# Patient Record
Sex: FEMALE | Race: BLACK OR AFRICAN AMERICAN | NOT HISPANIC OR LATINO
[De-identification: names, ages, dates, MRNs, and addresses within clinical notes are randomized per-mention and may not be internally consistent; named-entity substitution may affect disease eponyms.]

---

## 2018-08-14 PROBLEM — Z00.00 ENCOUNTER FOR PREVENTIVE HEALTH EXAMINATION: Status: ACTIVE | Noted: 2018-08-14

## 2018-08-20 ENCOUNTER — APPOINTMENT (OUTPATIENT)
Dept: THORACIC SURGERY | Facility: CLINIC | Age: 73
End: 2018-08-20
Payer: MEDICARE

## 2018-08-20 VITALS
OXYGEN SATURATION: 100 % | HEIGHT: 63 IN | RESPIRATION RATE: 15 BRPM | SYSTOLIC BLOOD PRESSURE: 150 MMHG | DIASTOLIC BLOOD PRESSURE: 80 MMHG | TEMPERATURE: 98.1 F | HEART RATE: 63 BPM | BODY MASS INDEX: 31.54 KG/M2 | WEIGHT: 178 LBS

## 2018-08-20 DIAGNOSIS — Z87.19 PERSONAL HISTORY OF OTHER DISEASES OF THE DIGESTIVE SYSTEM: ICD-10-CM

## 2018-08-20 DIAGNOSIS — Z86.79 PERSONAL HISTORY OF OTHER DISEASES OF THE CIRCULATORY SYSTEM: ICD-10-CM

## 2018-08-20 DIAGNOSIS — Z98.890 OTHER SPECIFIED POSTPROCEDURAL STATES: ICD-10-CM

## 2018-08-20 DIAGNOSIS — Z86.39 PERSONAL HISTORY OF OTHER ENDOCRINE, NUTRITIONAL AND METABOLIC DISEASE: ICD-10-CM

## 2018-08-20 DIAGNOSIS — Z78.9 OTHER SPECIFIED HEALTH STATUS: ICD-10-CM

## 2018-08-20 DIAGNOSIS — Z87.891 PERSONAL HISTORY OF NICOTINE DEPENDENCE: ICD-10-CM

## 2018-08-20 PROCEDURE — 99205 OFFICE O/P NEW HI 60 MIN: CPT

## 2018-08-20 RX ORDER — DULAGLUTIDE 1.5 MG/.5ML
1.5 INJECTION, SOLUTION SUBCUTANEOUS
Qty: 6 | Refills: 0 | Status: ACTIVE | COMMUNITY
Start: 2018-03-16

## 2018-08-20 RX ORDER — INSULIN DETEMIR 100 [IU]/ML
100 INJECTION, SOLUTION SUBCUTANEOUS
Qty: 15 | Refills: 0 | Status: DISCONTINUED | COMMUNITY
Start: 2018-02-26

## 2018-08-20 RX ORDER — METOPROLOL SUCCINATE 100 MG/1
100 TABLET, EXTENDED RELEASE ORAL
Qty: 90 | Refills: 0 | Status: DISCONTINUED | COMMUNITY
Start: 2018-06-01

## 2018-08-20 RX ORDER — METHIMAZOLE 5 MG/1
5 TABLET ORAL
Qty: 69 | Refills: 0 | Status: ACTIVE | COMMUNITY
Start: 2018-05-13

## 2018-08-20 RX ORDER — RIVAROXABAN 20 MG/1
20 TABLET, FILM COATED ORAL
Qty: 90 | Refills: 0 | Status: ACTIVE | COMMUNITY
Start: 2018-06-15

## 2018-08-20 RX ORDER — INSULIN ASPART 100 [IU]/ML
100 INJECTION, SOLUTION INTRAVENOUS; SUBCUTANEOUS
Qty: 15 | Refills: 0 | Status: ACTIVE | COMMUNITY
Start: 2018-02-20

## 2018-08-20 RX ORDER — METOPROLOL TARTRATE 50 MG/1
50 TABLET, FILM COATED ORAL
Qty: 180 | Refills: 0 | Status: DISCONTINUED | COMMUNITY
Start: 2018-04-04

## 2018-08-20 RX ORDER — VERAPAMIL HYDROCHLORIDE 120 MG/1
120 CAPSULE, EXTENDED RELEASE ORAL
Qty: 90 | Refills: 0 | Status: ACTIVE | COMMUNITY
Start: 2018-06-29

## 2018-08-20 RX ORDER — EZETIMIBE 10 MG/1
10 TABLET ORAL
Qty: 90 | Refills: 0 | Status: ACTIVE | COMMUNITY
Start: 2018-04-25

## 2018-08-20 RX ORDER — METOPROLOL TARTRATE 50 MG/1
50 TABLET, FILM COATED ORAL TWICE DAILY
Qty: 180 | Refills: 0 | Status: ACTIVE | COMMUNITY

## 2018-08-20 RX ORDER — INSULIN DEGLUDEC INJECTION 100 U/ML
100 INJECTION, SOLUTION SUBCUTANEOUS
Qty: 45 | Refills: 0 | Status: ACTIVE | COMMUNITY
Start: 2018-03-16

## 2018-08-20 RX ORDER — AZITHROMYCIN 250 MG/1
250 TABLET, FILM COATED ORAL
Qty: 6 | Refills: 0 | Status: DISCONTINUED | COMMUNITY
Start: 2018-06-28

## 2018-08-20 RX ORDER — PANTOPRAZOLE 40 MG/1
40 TABLET, DELAYED RELEASE ORAL
Qty: 90 | Refills: 0 | Status: ACTIVE | COMMUNITY
Start: 2018-05-13

## 2018-11-29 ENCOUNTER — APPOINTMENT (OUTPATIENT)
Dept: VASCULAR SURGERY | Facility: CLINIC | Age: 73
End: 2018-11-29
Payer: MEDICARE

## 2018-11-29 VITALS
HEIGHT: 63 IN | WEIGHT: 178 LBS | HEART RATE: 58 BPM | DIASTOLIC BLOOD PRESSURE: 81 MMHG | BODY MASS INDEX: 31.54 KG/M2 | SYSTOLIC BLOOD PRESSURE: 163 MMHG

## 2018-11-29 DIAGNOSIS — Z80.9 FAMILY HISTORY OF MALIGNANT NEOPLASM, UNSPECIFIED: ICD-10-CM

## 2018-11-29 PROCEDURE — 93880 EXTRACRANIAL BILAT STUDY: CPT

## 2018-11-29 PROCEDURE — 99203 OFFICE O/P NEW LOW 30 MIN: CPT

## 2018-11-29 RX ORDER — PRAVASTATIN SODIUM 10 MG/1
10 TABLET ORAL DAILY
Refills: 0 | Status: DISCONTINUED | COMMUNITY
End: 2018-11-29

## 2018-11-29 RX ORDER — INSULIN DEGLUDEC INJECTION 200 U/ML
INJECTION, SOLUTION SUBCUTANEOUS
Refills: 0 | Status: ACTIVE | COMMUNITY

## 2018-11-29 RX ORDER — FLUTICASONE PROPIONATE 50 UG/1
50 SPRAY, METERED NASAL
Qty: 16 | Refills: 0 | Status: ACTIVE | COMMUNITY
Start: 2018-10-29

## 2018-12-07 ENCOUNTER — OTHER (OUTPATIENT)
Age: 73
End: 2018-12-07

## 2018-12-17 ENCOUNTER — APPOINTMENT (OUTPATIENT)
Dept: THORACIC SURGERY | Facility: CLINIC | Age: 73
End: 2018-12-17
Payer: MEDICARE

## 2018-12-17 VITALS
RESPIRATION RATE: 16 BRPM | DIASTOLIC BLOOD PRESSURE: 79 MMHG | TEMPERATURE: 97.9 F | HEIGHT: 63 IN | HEART RATE: 65 BPM | OXYGEN SATURATION: 96 % | BODY MASS INDEX: 30.48 KG/M2 | WEIGHT: 172 LBS | SYSTOLIC BLOOD PRESSURE: 151 MMHG

## 2018-12-17 DIAGNOSIS — J98.59 OTHER DISEASES OF MEDIASTINUM, NOT ELSEWHERE CLASSIFIED: ICD-10-CM

## 2018-12-17 PROCEDURE — 99215 OFFICE O/P EST HI 40 MIN: CPT

## 2019-05-20 ENCOUNTER — OTHER (OUTPATIENT)
Age: 74
End: 2019-05-20

## 2019-05-23 ENCOUNTER — OTHER (OUTPATIENT)
Age: 74
End: 2019-05-23

## 2019-05-28 ENCOUNTER — OTHER (OUTPATIENT)
Age: 74
End: 2019-05-28

## 2019-06-03 ENCOUNTER — APPOINTMENT (OUTPATIENT)
Dept: THORACIC SURGERY | Facility: CLINIC | Age: 74
End: 2019-06-03
Payer: MEDICARE

## 2019-06-03 VITALS
RESPIRATION RATE: 18 BRPM | WEIGHT: 173 LBS | TEMPERATURE: 97.7 F | HEART RATE: 57 BPM | BODY MASS INDEX: 30.65 KG/M2 | DIASTOLIC BLOOD PRESSURE: 80 MMHG | SYSTOLIC BLOOD PRESSURE: 161 MMHG | OXYGEN SATURATION: 97 %

## 2019-06-03 PROCEDURE — 99215 OFFICE O/P EST HI 40 MIN: CPT

## 2019-06-03 NOTE — HISTORY OF PRESENT ILLNESS
[FreeTextEntry1] : 73 year old female former smoker with history of A fib (on Xarelto, s/p ablation 5/2017) hyperlipidemia, and hyperthyroidism, GERD, Diabetes and left sided implanted Cardiac Loop recorder. She is referred by Dr. Gordon Marquez. As per his note: the patient had a CXR in June 2018 which revealed an anterior mediastinal mass. CT Scan of the Chest revealed bilateral pulmonary nodules. \par  \par CXR 6/28/2018: \par -Possible Mediastinal Mass (as per Dr. Marquez's note) \par -Prominent Right aspect Mid Mediastinum \par - Tortuous thoracic aorta\par -Mild Cardiomegaly\par \par CT Chest 7/23/2018 reveals: (Comparison made to CT Pulmonary Angiogram 12/12/2012 and CT Cardiac and pulmonary Venogram 5/19/2017)\par - Minimal increase in size of LLL nodule\par - Shotty precarinal amd subcarinal LN stable\par - Old 2.3 cm bleb LLL anterolateral\par - 2 mm nodule RML\par - 4mm subpleural nodule Superior segment RLL\par - 3 mm nodule RLL anterior\par - 4mm nodule\par - 3 mm nodule lingula \par - interval increase in size of 3 mm nodule LLL (previous 1 mm in 12/2012 and 2 mm 5/2017) \par \par CT chest on 12/13/18:\par - no new parenchymal masses or nodules\par - multiple stable suncentimeter noncalcified bilateral pulmonary nodules, up to 5 mm, largest in superior RLL. \par - stable nodules in LLL, 3 mm \par - stable subcentimeter paratracheal, aorticcopulmonary window, and subcarinal LNs\par \par CT Chest on 5/29/19:\par - stable 5mm RLL\par - stable 3mm LLL\par - emphysematous lung changes\par \par Pt presents today for follow up. The patient denies SOB, cough, chest pain, hemoptysis, palpitation, fever, recent illness, hospitalization and significant weight loss.\par

## 2019-06-03 NOTE — CONSULT LETTER
[FreeTextEntry2] : Gordon Marquez MD  [FreeTextEntry3] : Horacio Garrett MD \par Department of Cardiovascular and Thoracic Surgery \par  \par [facility] at [facility] \par \par \par

## 2019-06-03 NOTE — ASSESSMENT
[FreeTextEntry1] : 73 year old female former smoker with history of A fib (on Xarelto, s/p ablation 5/2017) hyperlipidemia, and hyperthyroidism, GERD, Diabetes and left sided implanted Cardiac Loop recorder. She is referred by Dr. Gordon Marquez. As per his note: the patient had a CXR in June 2018 which revealed an anterior mediastinal mass. CT Scan of the Chest revealed bilateral pulmonary nodules. \par \par CT Chest on 5/29/19:\par - stable 5mm RLL\par - stable 3mm LLL\par - emphysematous lung changes\par \par I have reviewed the patient's medical records and diagnostic images at time of this office consultation and have made the following recommendation:\par Plan:\par 1. RTC in 6 months with CT chest w/o contrast. \par \par \par \par Written by Nubia Marley NP, acting as a scribe for Dr. Horacio Garrett.    \par “The documentation recorded by the scribe accurately reflects the service I personally performed and the decisions made by me.” Horacio Garrett MD.\par \par \par

## 2019-06-03 NOTE — DATA REVIEWED
[FreeTextEntry1] : CT Chest on 5/29/19:\par - stable 5mm RLL\par - stable 3mm LLL\par - emphysematous lung changes

## 2019-06-03 NOTE — PHYSICAL EXAM
[Sclera] : the sclera and conjunctiva were normal [PERRL With Normal Accommodation] : pupils were equal in size, round, and reactive to light [Neck Appearance] : the appearance of the neck was normal [Respiration, Rhythm And Depth] : normal respiratory rhythm and effort [Auscultation Breath Sounds / Voice Sounds] : lungs were clear to auscultation bilaterally [Heart Rate And Rhythm] : heart rate was normal and rhythm regular [Heart Sounds] : normal S1 and S2 [Examination Of The Chest] : the chest was normal in appearance [2+] : left 2+ [No Pulse Delay] : no pulse delay [Bowel Sounds] : normal bowel sounds [Abdomen Soft] : soft [Abdomen Tenderness] : non-tender [Cervical Lymph Nodes Enlarged Posterior Bilaterally] : posterior cervical [Cervical Lymph Nodes Enlarged Anterior Bilaterally] : anterior cervical [No CVA Tenderness] : no ~M costovertebral angle tenderness [Abnormal Walk] : normal gait [Involuntary Movements] : no involuntary movements were seen [Skin Color & Pigmentation] : normal skin color and pigmentation [Skin Turgor] : normal skin turgor [] : no rash [No Focal Deficits] : no focal deficits [Oriented To Time, Place, And Person] : oriented to person, place, and time [Affect] : the affect was normal [Mood] : the mood was normal [Fingers] :  capillary refill of the fingers was normal [FreeTextEntry1] : deferred

## 2019-10-23 ENCOUNTER — OTHER (OUTPATIENT)
Age: 74
End: 2019-10-23

## 2019-10-30 ENCOUNTER — OTHER (OUTPATIENT)
Age: 74
End: 2019-10-30

## 2019-11-01 ENCOUNTER — OTHER (OUTPATIENT)
Age: 74
End: 2019-11-01

## 2019-11-18 ENCOUNTER — APPOINTMENT (OUTPATIENT)
Dept: THORACIC SURGERY | Facility: CLINIC | Age: 74
End: 2019-11-18
Payer: MEDICARE

## 2019-11-18 VITALS
BODY MASS INDEX: 29.41 KG/M2 | RESPIRATION RATE: 16 BRPM | SYSTOLIC BLOOD PRESSURE: 149 MMHG | TEMPERATURE: 98.4 F | OXYGEN SATURATION: 93 % | HEART RATE: 74 BPM | WEIGHT: 166 LBS | DIASTOLIC BLOOD PRESSURE: 81 MMHG

## 2019-11-18 PROCEDURE — 99214 OFFICE O/P EST MOD 30 MIN: CPT

## 2019-11-18 NOTE — HISTORY OF PRESENT ILLNESS
[FreeTextEntry1] : 74 year old female former smoker with history of A fib (on Xarelto, s/p ablation 5/2017) hyperlipidemia, and hyperthyroidism, GERD, Diabetes and left sided implanted Cardiac Loop recorder. She is referred by Dr. Gordon Marquez. As per his note: the patient had a CXR in June 2018 which revealed an anterior mediastinal mass. CT Scan of the Chest revealed bilateral pulmonary nodules. \par  \par CT chest on 12/13/18:\par - no new parenchymal masses or nodules\par - multiple stable subcentimeter noncalcified bilateral pulmonary nodules, up to 5 mm, largest in superior RLL. \par - stable nodules in LLL, 3 mm \par - stable subcentimeter paratracheal, aorticcopulmonary window, and subcarinal LNs\par \par CT Chest on 5/29/19:\par - stable 5mm RLL\par - stable 3mm LLL\par - emphysematous lung changes\par \par CT Chest on 11/7/19:\par - stable 5 mm nodule RLL\par - 3-4 mm nodule, stable \par \par Pt presents today for follow up. Pt admits to SOB on exertion, denies cough or CP. \par

## 2019-11-18 NOTE — ASSESSMENT
[FreeTextEntry1] : 74 year old female former smoker with history of A fib (on Xarelto, s/p ablation 5/2017) hyperlipidemia, and hyperthyroidism, GERD, Diabetes and left sided implanted Cardiac Loop recorder. She is referred by Dr. Gordon Marquez for bilateral pulmonary nodules. \par  \par CT Chest on 11/7/19:\par - stable 5 mm nodule RLL\par - 3-4 mm nodule, stable \par \par I have reviewed the patient's medical records and diagnostic images at time of this office consultation and have made the following recommendation:\par 1. CT reviewed with pt,  stable scan. RTC in 1 yr with CT Chest w/o contrast.\par \par \par Written by Sabina Mcdaniel NP, acting as a scribe for Dr. Horacio Lloyd. \par \par The documentation recorded by the scribe accurately reflects the service I personally performed and the decisions made by me. HORACIO LLOYD MD

## 2019-11-18 NOTE — CONSULT LETTER
[Dear  ___] : Dear  [unfilled], [Courtesy Letter:] : I had the pleasure of seeing your patient, [unfilled], in my office today. [Please see my note below.] : Please see my note below. [Sincerely,] : Sincerely, [FreeTextEntry2] : Gordon Marquez MD  [FreeTextEntry3] : Horacio Garrett MD \par Department of Cardiovascular and Thoracic Surgery \par  \par [facility] at [facility] \par \par \par

## 2019-11-18 NOTE — PHYSICAL EXAM
[Auscultation Breath Sounds / Voice Sounds] : lungs were clear to auscultation bilaterally [Heart Sounds Gallop] : no gallops [Heart Rate And Rhythm] : heart rate was normal and rhythm regular [Heart Sounds] : normal S1 and S2 [Murmurs] : no murmurs [Heart Sounds Pericardial Friction Rub] : no pericardial rub [Examination Of The Chest] : the chest was normal in appearance [Chest Visual Inspection Thoracic Asymmetry] : no chest asymmetry [Diminished Respiratory Excursion] : normal chest expansion [Bowel Sounds] : normal bowel sounds [Abdomen Tenderness] : non-tender [Abdomen Soft] : soft [Abdomen Mass (___ Cm)] : no abdominal mass palpated [Abnormal Walk] : normal gait [Nail Clubbing] : no clubbing  or cyanosis of the fingernails [Musculoskeletal - Swelling] : no joint swelling seen [Motor Tone] : muscle strength and tone were normal [Skin Color & Pigmentation] : normal skin color and pigmentation [Skin Turgor] : normal skin turgor [] : no rash [Sensation] : the sensory exam was normal to light touch and pinprick [Deep Tendon Reflexes (DTR)] : deep tendon reflexes were 2+ and symmetric [No Focal Deficits] : no focal deficits [Oriented To Time, Place, And Person] : oriented to person, place, and time [Affect] : the affect was normal [Impaired Insight] : insight and judgment were intact

## 2020-09-09 ENCOUNTER — APPOINTMENT (OUTPATIENT)
Dept: PULMONOLOGY | Facility: CLINIC | Age: 75
End: 2020-09-09
Payer: MEDICARE

## 2020-09-09 VITALS
WEIGHT: 166 LBS | TEMPERATURE: 98.1 F | BODY MASS INDEX: 29.41 KG/M2 | OXYGEN SATURATION: 95 % | RESPIRATION RATE: 15 BRPM | DIASTOLIC BLOOD PRESSURE: 75 MMHG | HEIGHT: 63 IN | HEART RATE: 65 BPM | SYSTOLIC BLOOD PRESSURE: 136 MMHG

## 2020-09-09 PROCEDURE — 99204 OFFICE O/P NEW MOD 45 MIN: CPT | Mod: 25

## 2020-09-09 PROCEDURE — 36415 COLL VENOUS BLD VENIPUNCTURE: CPT

## 2020-09-09 RX ORDER — BUDESONIDE AND FORMOTEROL FUMARATE DIHYDRATE 160; 4.5 UG/1; UG/1
160-4.5 AEROSOL RESPIRATORY (INHALATION) TWICE DAILY
Qty: 2 | Refills: 2 | Status: ACTIVE | COMMUNITY
Start: 2020-09-09 | End: 1900-01-01

## 2020-09-09 NOTE — CONSULT LETTER
[Dear  ___] : Dear  [unfilled], [Please see my note below.] : Please see my note below. [Consult Letter:] : I had the pleasure of evaluating your patient, [unfilled]. [Consult Closing:] : Thank you very much for allowing me to participate in the care of this patient.  If you have any questions, please do not hesitate to contact me. [Sincerely,] : Sincerely, [FreeTextEntry3] : Yours truly,\par \par Gordon Marquez MD\par

## 2020-09-09 NOTE — PHYSICAL EXAM
[Normal Oropharynx] : normal oropharynx [No Acute Distress] : no acute distress [Normal Appearance] : normal appearance [No Neck Mass] : no neck mass [Normal S1, S2] : normal s1, s2 [Normal Rate/Rhythm] : normal rate/rhythm [No Resp Distress] : no resp distress [No Murmurs] : no murmurs [No Abnormalities] : no abnormalities [Clear to Auscultation Bilaterally] : clear to auscultation bilaterally [Benign] : benign [Normal Gait] : normal gait [No Clubbing] : no clubbing [No Cyanosis] : no cyanosis [No Edema] : no edema [FROM] : FROM [No Focal Deficits] : no focal deficits [Normal Color/ Pigmentation] : normal color/ pigmentation [Oriented x3] : oriented x3 [Normal Affect] : normal affect

## 2020-09-09 NOTE — REASON FOR VISIT
[Initial] : an initial visit [COPD] : COPD [Sleep Apnea] : sleep apnea [Abnormal CXR/ Chest CT] : an abnormal CXR/ chest CT [Pulmonary Nodules] : pulmonary nodules [Shortness of Breath] : shortness of breath [TextBox_13] : Dr Ashley Pereyra

## 2020-09-13 LAB
ALBUMIN SERPL ELPH-MCNC: 4.1 G/DL
ALP BLD-CCNC: 57 U/L
ALT SERPL-CCNC: 14 U/L
ANION GAP SERPL CALC-SCNC: 13 MMOL/L
AST SERPL-CCNC: 18 U/L
BASOPHILS # BLD AUTO: 0.03 K/UL
BASOPHILS NFR BLD AUTO: 0.4 %
BILIRUB SERPL-MCNC: 0.3 MG/DL
BUN SERPL-MCNC: 12 MG/DL
CALCIUM SERPL-MCNC: 9.2 MG/DL
CHLORIDE SERPL-SCNC: 104 MMOL/L
CO2 SERPL-SCNC: 21 MMOL/L
CREAT SERPL-MCNC: 0.81 MG/DL
EOSINOPHIL # BLD AUTO: 0.19 K/UL
EOSINOPHIL # BLD MANUAL: 210 /UL
EOSINOPHIL NFR BLD AUTO: 2.6 %
GLUCOSE SERPL-MCNC: 260 MG/DL
HCT VFR BLD CALC: 43.3 %
HGB BLD-MCNC: 13.2 G/DL
IMM GRANULOCYTES NFR BLD AUTO: 0.4 %
LYMPHOCYTES # BLD AUTO: 2.4 K/UL
LYMPHOCYTES NFR BLD AUTO: 33.1 %
MAN DIFF?: NORMAL
MCHC RBC-ENTMCNC: 28.7 PG
MCHC RBC-ENTMCNC: 30.5 GM/DL
MCV RBC AUTO: 94.1 FL
MONOCYTES # BLD AUTO: 0.49 K/UL
MONOCYTES NFR BLD AUTO: 6.7 %
NEUTROPHILS # BLD AUTO: 4.12 K/UL
NEUTROPHILS NFR BLD AUTO: 56.8 %
NT-PROBNP SERPL-MCNC: 346 PG/ML
PLATELET # BLD AUTO: 279 K/UL
POTASSIUM SERPL-SCNC: 4.5 MMOL/L
PROT SERPL-MCNC: 6.2 G/DL
RBC # BLD: 4.6 M/UL
RBC # FLD: 16.3 %
SODIUM SERPL-SCNC: 137 MMOL/L
TOTAL IGE SMQN RAST: 348 KU/L
TSH SERPL-ACNC: 0.04 UIU/ML
WBC # FLD AUTO: 7.26 K/UL

## 2020-09-17 LAB — SARS-COV-2 N GENE NPH QL NAA+PROBE: NOT DETECTED

## 2020-09-18 ENCOUNTER — APPOINTMENT (OUTPATIENT)
Dept: PULMONOLOGY | Facility: CLINIC | Age: 75
End: 2020-09-18
Payer: MEDICARE

## 2020-09-18 PROCEDURE — 94729 DIFFUSING CAPACITY: CPT

## 2020-09-18 PROCEDURE — 94010 BREATHING CAPACITY TEST: CPT

## 2020-09-18 PROCEDURE — 94727 GAS DIL/WSHOT DETER LNG VOL: CPT

## 2020-09-24 ENCOUNTER — APPOINTMENT (OUTPATIENT)
Dept: PULMONOLOGY | Facility: CLINIC | Age: 75
End: 2020-09-24
Payer: MEDICARE

## 2020-09-24 VITALS
DIASTOLIC BLOOD PRESSURE: 80 MMHG | WEIGHT: 166 LBS | BODY MASS INDEX: 29.41 KG/M2 | SYSTOLIC BLOOD PRESSURE: 127 MMHG | HEIGHT: 63 IN | HEART RATE: 67 BPM | RESPIRATION RATE: 16 BRPM | OXYGEN SATURATION: 97 %

## 2020-09-24 PROCEDURE — 99204 OFFICE O/P NEW MOD 45 MIN: CPT

## 2020-09-24 PROCEDURE — 99213 OFFICE O/P EST LOW 20 MIN: CPT

## 2020-09-24 NOTE — PHYSICAL EXAM
[General Appearance - Well Developed] : well developed [Normal Appearance] : normal appearance [General Appearance - Well Nourished] : well nourished [Low Lying Soft Palate] : low lying soft palate [III] : III [Neck Appearance] : the appearance of the neck was normal [Neck Cervical Mass (___cm)] : no neck mass was observed [Apical Impulse] : the apical impulse was normal [Heart Rate And Rhythm] : heart rate was normal and rhythm regular [Heart Sounds] : normal S1 and S2 [] : no respiratory distress [Respiration, Rhythm And Depth] : normal respiratory rhythm and effort [Auscultation Breath Sounds / Voice Sounds] : lungs were clear to auscultation bilaterally [Nail Clubbing] : no clubbing of the fingernails [Cyanosis, Localized] : no localized cyanosis [No Focal Deficits] : no focal deficits [Oriented To Time, Place, And Person] : oriented to person, place, and time [Impaired Insight] : insight and judgment were intact [Affect] : the affect was normal [FreeTextEntry2] : no edema

## 2020-09-24 NOTE — REASON FOR VISIT
[Consultation] : a consultation visit [Sleep Apnea] : sleep apnea [FreeTextEntry1] : Referred by Dr. Marquez

## 2020-09-24 NOTE — CONSULT LETTER
[Dear  ___] : Dear  [unfilled], [Consult Letter:] : I had the pleasure of evaluating your patient, [unfilled]. [Please see my note below.] : Please see my note below. [Consult Closing:] : Thank you very much for allowing me to participate in the care of this patient.  If you have any questions, please do not hesitate to contact me. [Sincerely,] : Sincerely, [FreeTextEntry3] : Lon Miller DO, MPH\par Pulmonary, Critical Care, and Sleep Medicine\par  of Medicine\par [employer] [facility]

## 2020-09-24 NOTE — REVIEW OF SYSTEMS
[EDS: ESS=____] : daytime somnolence: ESS=[unfilled] [Snoring] : snoring [Witnessed Apneas] : witnessed apnea [A.M. Headache] : headache present upon awakening [Arthralgias] : arthralgias [Difficulty Maintaining Sleep] : difficulty maintaining sleep [Negative] : Psychiatric [Difficulty Initiating Sleep] : no difficulty falling asleep [Lower Extremity Discomfort] : no lower extremity discomfort [Irresistible urge to move legs] : no irresistible urge to move legs because of lower extremity discomfort [Late day/ Evening symptoms] : no late day/evening symptoms [Hypersomnolence] : not sleeping much more than usual [Cataplexy] :  no cataplexy [Hypnogogic Hallucinations] : no hypnogogic hallucinations [Hypnopompic Hallucinations] : no hypnopompic hallucinations [Sleep Paralysis] : no sleep paralysis

## 2020-09-24 NOTE — ASSESSMENT
[FreeTextEntry1] : This is a 74 year old female referred by Jimmy for a new CPAP machine. Since her last sleep study was 10 years ago, will get a new sleep study to confirm diagnosis and severity. Will contact Miguel to obtain previous sleep records. The patient has multiple signs and symptoms of ALICIA without using her CPAP machine include snoring, witnessed apnea, nonrestorative sleep, and frequent nocturnal awakenings. She was referred to the [facility] for a diagnostic HSAT. The ramifications of ALICIA and its potential therapeutic modalities were discussed with the patient. The patient was cautioned on the importance of avoiding drowsy driving. She will follow up with us after the HSAT. If previous records are obtained and her apnea is roughly the same on the HST then will order CPAP settings from her previous CPAP machine.

## 2020-09-30 ENCOUNTER — APPOINTMENT (OUTPATIENT)
Dept: PULMONOLOGY | Facility: CLINIC | Age: 75
End: 2020-09-30
Payer: MEDICARE

## 2020-09-30 VITALS
SYSTOLIC BLOOD PRESSURE: 129 MMHG | OXYGEN SATURATION: 97 % | RESPIRATION RATE: 14 BRPM | WEIGHT: 165 LBS | BODY MASS INDEX: 29.23 KG/M2 | HEART RATE: 72 BPM | HEIGHT: 63 IN | TEMPERATURE: 98.3 F | DIASTOLIC BLOOD PRESSURE: 78 MMHG

## 2020-09-30 PROCEDURE — 99214 OFFICE O/P EST MOD 30 MIN: CPT

## 2020-09-30 RX ORDER — BUDESONIDE AND FORMOTEROL FUMARATE DIHYDRATE 80; 4.5 UG/1; UG/1
80-4.5 AEROSOL RESPIRATORY (INHALATION)
Qty: 3 | Refills: 1 | Status: ACTIVE | COMMUNITY
Start: 2020-09-30 | End: 1900-01-01

## 2020-09-30 NOTE — CONSULT LETTER
[Dear  ___] : Dear  [unfilled], [Consult Letter:] : I had the pleasure of evaluating your patient, [unfilled]. [Please see my note below.] : Please see my note below. [Consult Closing:] : Thank you very much for allowing me to participate in the care of this patient.  If you have any questions, please do not hesitate to contact me. [Sincerely,] : Sincerely, [FreeTextEntry3] : Yours truly,\par \par Gordon Marquez MD\par

## 2020-09-30 NOTE — ASSESSMENT
[FreeTextEntry1] : \par \par _____________________________\par HPI\par _____________________________  \par \par 2020 Ms Troncoso is 74 f PMH A fin hypertrophic cardiomyopathy diagnosed with ALICIA 10 y ago and was referred to me for lung nodules sleep apnea and COPD \par She is on xarelto for a fib \par In 2020 she was referred to see sleep specialis Dr Lon Miller \par 2020 She is going for home sleep test \par \par \par \par VISIT DATES \par 2020 SOB at times Takes syymbicort prn and feels better with it but does not need it much once a wweek\par 2020 No wt loss No hemoptysis No swellinglegs \par \par \par ALLERGY. 2020 pncl Has taken zpak in past without problem \par HABITS (ETOH/DRUGS).   2020 Quit smoking  1 ppd No ETOH abuse No drug abuse \par FAMILY.   DM Mother \par Uterine Mother  at age 67  \par Esophagus Brother  at age 51 \par BIRTHPLACE[place]par [place] \par TRAVEL.                 \par IMMUNIZATION.                           \par PETS.                \par OCCUPN.       [facility] Child support\par \par                                             \par CURRENT SMOKING.  \par 2020 No\par 2020 No    .         \par COUGH.    \par 2020 No\par 2020 Occasionally in am .                        \par PHLEGM. \par 2020 Has post nasal drip\par   2020 Yes from nose    \par CLUBBING.    \par 2020 No\par 2020 No                .\par WHEEZE.  \par 2020 No \par 2020 No \par JUGULAR VENOUS DISTENTION.  \par 2020 No \par 2020 No                            \par PEDAL EDEMA.   \par 2020 no \par 2020 No    \par \par                                             \par BP.      \par 2020 129/70 \par 2020 136/75    \par HR.  \par 2020 72 \par 2020 65                                                     \par WT. \par 2020 165 lb     \par 2020 166                        \par BMI.  \par 2020 29 \par 2020 29     \par PULSE OX.\par REST.\par 2020 ra 97% \par 2020 95%\par EXERCISE. \par \par \par MONITORING. \par \par DYSPNEA. \par DATE                  MMRC (M)\par 2020            1\par \par    \par DATE                       COMMENTS   \par 2020               1 I get short of breath when hurrying on level ground or walking up a slight hill \par \par \par \par \par _____________________________\par PROBLEM LIST\par _____________________________  \par \par LUNG NODULES\par 2018 Subcm nodules found\par 2018 Referred to Dr SAIRA Garrett\par Former 1 ppd smoker quit \par Fam HO Ca Uterine mother  age 67 Esoph father and brother  age 51\par \par \par \par COPD\par 2020 75 f gets sob on exertion\par Former smoker quit  Used to smoke 3/4 ppd \par 2020 No  hospital stays \par 2020Bronchitis episode urgent care once around \par FAM  No asthma \par PETS None Used to have dog\par MMRC2020 MMRC 2\par SPIROMETRY.2020 No obstr Mild retsrictive pattern FEF 25 75 decreased suggest small airway obstrn\par LUNG VOLUME.2020 Mild restrn \par DIFFUSION. 2020  Diffusion preserved\par COMMENTS. 2020 pfts cw asthma or chr bronchitis Mild restrn possibly sec to chf\par \par \par SLEEP APNEA\par On CPAP 9 y (2020) \par 3/20/2019 Uses CPAP No EDS \par 2018 has been using CPAP for about 8 years \par 2020 Machine is not working right Wants to see sleep sspecialist as she is getting headaches\par 2020 refre Sleep specialist               \par \par \par ______________________________\par ASSESSMENT/PROBLEMS/ORDERS \par \par _______________________________    \par \par HYPERTHYROID\par 9/10/2020 TSH was .04 (.27-4.2 Advised to dw her PMD and see endocrine \par \par ELEVATED IGE \par 9/10/2020  \par 2020 Results dw pt She may have component of asthma \par \par ASTHMA \par 2020 Results of 2020 PFTs dw pt Explained she may have asthma or chr bronchitis \par 2020 She has scant phlegm and non nocturnal awakenings and no limitation in acvtivity due to breathing so her asthma seems to be well controlled\par She is on Symbicort \par 2020 Will chane to symbicotrt 80 which she takes as needed up to 8p/d\par 2020 Inhaler instructions given and advised to use spacer \par \par LUNG NODULES \par 2020 Pt was denied auth for ct chest\par 2020 She has been seeing Dr SAIRA Garrett CTS and I will refer to him and his office will get auth for ct \par 2020 She is former smoker and has fam ho cancer so will need ct chest \par 2020 RTC 6 wk \par \par \par GURPREET GRESHAM  1945 [phone] (H) [phone] (M) Svitlana merchant MD \par \par

## 2020-10-16 ENCOUNTER — APPOINTMENT (OUTPATIENT)
Dept: SLEEP CENTER | Facility: CLINIC | Age: 75
End: 2020-10-16
Payer: MEDICARE

## 2020-10-16 PROCEDURE — ZZZZZ: CPT

## 2020-10-22 ENCOUNTER — APPOINTMENT (OUTPATIENT)
Dept: SLEEP CENTER | Facility: CLINIC | Age: 75
End: 2020-10-22
Payer: MEDICARE

## 2020-10-22 ENCOUNTER — FORM ENCOUNTER (OUTPATIENT)
Age: 75
End: 2020-10-22

## 2020-10-22 PROCEDURE — ZZZZZ: CPT

## 2020-11-09 ENCOUNTER — NON-APPOINTMENT (OUTPATIENT)
Age: 75
End: 2020-11-09

## 2020-11-19 ENCOUNTER — APPOINTMENT (OUTPATIENT)
Dept: DISASTER EMERGENCY | Facility: CLINIC | Age: 75
End: 2020-11-19

## 2020-11-20 LAB — SARS-COV-2 N GENE NPH QL NAA+PROBE: NOT DETECTED

## 2020-11-22 ENCOUNTER — FORM ENCOUNTER (OUTPATIENT)
Age: 75
End: 2020-11-22

## 2020-11-23 ENCOUNTER — OUTPATIENT (OUTPATIENT)
Dept: OUTPATIENT SERVICES | Facility: HOSPITAL | Age: 75
LOS: 1 days | End: 2020-11-23
Payer: COMMERCIAL

## 2020-11-23 ENCOUNTER — APPOINTMENT (OUTPATIENT)
Dept: SLEEP CENTER | Facility: CLINIC | Age: 75
End: 2020-11-23
Payer: MEDICARE

## 2020-11-23 PROCEDURE — 95810 POLYSOM 6/> YRS 4/> PARAM: CPT | Mod: 26

## 2020-11-23 PROCEDURE — 95810 POLYSOM 6/> YRS 4/> PARAM: CPT

## 2020-11-24 DIAGNOSIS — G47.33 OBSTRUCTIVE SLEEP APNEA (ADULT) (PEDIATRIC): ICD-10-CM

## 2020-11-25 ENCOUNTER — APPOINTMENT (OUTPATIENT)
Dept: PULMONOLOGY | Facility: CLINIC | Age: 75
End: 2020-11-25
Payer: MEDICARE

## 2020-11-25 VITALS
TEMPERATURE: 98.6 F | OXYGEN SATURATION: 97 % | BODY MASS INDEX: 29.08 KG/M2 | WEIGHT: 164.19 LBS | RESPIRATION RATE: 14 BRPM | HEART RATE: 62 BPM | DIASTOLIC BLOOD PRESSURE: 83 MMHG | SYSTOLIC BLOOD PRESSURE: 117 MMHG

## 2020-11-25 PROCEDURE — 99214 OFFICE O/P EST MOD 30 MIN: CPT | Mod: 95

## 2020-11-25 NOTE — ASSESSMENT
[FreeTextEntry1] : _____________________________\par PROBLEM LIST\par _____________________________ .  \par LUNG NODULES\par COPD \par SLEEP APNEA\par POST NASAL DRIP\par GERD\par \par A fib\par HLD \par HYPERTHYROID\par DM\par \par PULSE OX  \par 2020 RA rest 97% \par                                          \par \par                                             \par BP.\par 2020 117/80 \par         \par HR.\par 2020 80 \par                                                                            \par WT.\par 2020 164 lb\par                           \par BMI.   \par 2020 29 \par \par ASSESSMENT PLAN\par 2020 \par LUNG NODULES \par ct Chest [facility] 2018 222  cw 2018 \par 1) No new parenchymal masses or nodules \par 2) Multiple stable subcm noncalci bl nodules upto 5 mm the klargest within the sup rll and in rll \par 3) 2 nodules as well as 3 mm lll nodule are stable cw cta 2017 \par Former 1 ppd smoker quit \par Fam HO Ca Uterine mother  age 67 Esoph father and brother  age 51\par 2020 Pt had been denied ct chest and i had referred her to see Dr SAIRA Garrett for his evaluation \par 2020 Has appt with Dr Garrett in  \par 2020 RTC 2020\par COPD \par Former smoker quit  Used to smoke 3/4 ppd \par 2020 No  hospital stays \par 2020Bronchitis episode urgent care once around \par FAM  No asthma \par PETS None Used to have dog\par PFTS 2020 FEF 25 75 37% FEV1/FVC 71% FEV1 71% dlco 132% TLC 64% MILD RESTRICTION POSSIBLE SMALL AIRWAYS OBSTRUCTION \par Symbicort (2020) --> Symbicort 80 (2020) \par 2020 Rarely has to use symbicort \par 2020 Has intermittent asthma Inhaler instructions reinforced \par POSTNASAL DRIP\par Was started on nasal inhaler in past 2018 Start nasalcort Advised ns nasal spray Sent to [facility] [id number]\par 2020 Sill has postnasal drip \par 2020 Has flonase but not using it regularly \par 2020 Advised to use daily\par SLEEP APNEA\par On CPAP 9 y (2020) \par Advised to use cpap machine regularly as recommended\par GERD\par Advised to sleep with head propped up and to not consume meals for at least 1 hour before bedtime \par 2020 is on pantoprazole \par A fib\par  (on xarelto, s/p ablation in 2017), \par Cardiology, Dr. Chauncey Pierre, [facility] in [facility] for Afib. \par PALPITATIONS\par Has Loop recorder for palpitations.\par \par \par \par \par \par \par \par ___________________\par PATIENT NAME SHAN GRESHAM 1945\par PATIENT INSTRUCTIONS \par Copy was handed to patient at end of visit\par DATE OF VISIT 2020\par _______________________________\par \par 1) Come back to see Dr Marquez 2020 \par 2) See Dr Garrett in Dec as planned \par 3) Use flonas daily to see if it will help the postnasal drip as well as bbreathing\par 4) Keep using symbicort 1 puff every 4 hours if needed for shortness of breath or wheeze and wash mouth after using it \par

## 2020-12-06 ENCOUNTER — RX RENEWAL (OUTPATIENT)
Age: 75
End: 2020-12-06

## 2020-12-07 ENCOUNTER — NON-APPOINTMENT (OUTPATIENT)
Age: 75
End: 2020-12-07

## 2020-12-22 ENCOUNTER — APPOINTMENT (OUTPATIENT)
Dept: THORACIC SURGERY | Facility: CLINIC | Age: 75
End: 2020-12-22

## 2021-01-12 ENCOUNTER — APPOINTMENT (OUTPATIENT)
Dept: THORACIC SURGERY | Facility: CLINIC | Age: 76
End: 2021-01-12
Payer: COMMERCIAL

## 2021-01-12 VITALS
RESPIRATION RATE: 16 BRPM | DIASTOLIC BLOOD PRESSURE: 61 MMHG | WEIGHT: 164 LBS | OXYGEN SATURATION: 97 % | HEART RATE: 61 BPM | SYSTOLIC BLOOD PRESSURE: 129 MMHG | BODY MASS INDEX: 28.7 KG/M2 | HEIGHT: 63.5 IN

## 2021-01-12 DIAGNOSIS — J45.20 MILD INTERMITTENT ASTHMA, UNCOMPLICATED: ICD-10-CM

## 2021-01-12 PROCEDURE — 99213 OFFICE O/P EST LOW 20 MIN: CPT

## 2021-01-12 PROCEDURE — 99072 ADDL SUPL MATRL&STAF TM PHE: CPT

## 2021-01-12 RX ORDER — LISINOPRIL 2.5 MG/1
2.5 TABLET ORAL
Qty: 90 | Refills: 0 | Status: DISCONTINUED | COMMUNITY
Start: 2018-05-13 | End: 2021-01-12

## 2021-01-12 NOTE — ASSESSMENT
[FreeTextEntry1] : 75 year old female former smoker with history of A-fib (on Xarelto, s/p ablation 5/2017) hyperlipidemia, and hyperthyroidism, GERD, Diabetes and left sided implanted Cardiac Loop recorder. \par  \par PFTs on 9/18/2020: FVC 69%, FEV1 71%, DLCO 81%.\par \par CT Chest on 1/6/21:\par - stable moderate emphysema\par - stable, small lung nodules bilaterally since 12/2018: 5 x 5mm RLL (7:106); 4 x 3mm LLL (7:153); 3mm RML (7:164); 3mm RLL (7:184); 5 x 3mm RLL (7:204); 5 x 5mm RLL (7:228)\par - no new lung lesions\par \par I have reviewed the patient's medical records and diagnostic images at time of this office consultation and have made the following recommendation:\par 1. CT scan reviewed, multiple stable lung nodules, I recommended patient to RTC in 1yr with CT CHest w/o contrast.\par \par \par I personally performed the services described in the documentation, reviewed the documentation recorded by the scribe in my presence and it accurately and completely records my words and actions.\par \par I, Imelda Finley NP, am scribing for and the presence of JEFFREY Santos, the following sections HISTORY OF PRESENT ILLNESS, PAST MEDICAL/FAMILY/SOCIAL HISTORY; REVIEW OF SYSTEMS; VITAL SIGNS; PHYSICAL EXAM; DISPOSITION.\par \par

## 2021-01-12 NOTE — DATA REVIEWED
[FreeTextEntry1] : PFTs on 9/18/2020: FVC 69%, FEV1 71%, DLCO 81%.\par \par CT Chest on 1/6/21:\par - stable moderate emphysema\par - stable, small lung nodules bilaterally since 12/2018: 5 x 5mm RLL (7:106); 4 x 3mm LLL (7:153); 3mm RML (7:164); 3mm RLL (7:184); 5 x 3mm RLL (7:204); 5 x 5mm RLL (7:228)\par - no new lung lesions

## 2021-01-12 NOTE — CONSULT LETTER
[Consult Letter:] : I had the pleasure of evaluating your patient, [unfilled]. [( Thank you for referring [unfilled] for consultation for _____ )] : Thank you for referring [unfilled] for consultation for [unfilled] [Please see my note below.] : Please see my note below. [Consult Closing:] : Thank you very much for allowing me to participate in the care of this patient.  If you have any questions, please do not hesitate to contact me. [Sincerely,] : Sincerely, [FreeTextEntry2] : Gordon Marquez MD (Pulm) [FreeTextEntry3] : Horacio Garrett MD\par Director of Thoracic, [facility]\par Cardiovascular & Thoracic Surgery\par \par [facility]\par [address]\par Oncology Building 3rd Fl\[place] [address]\par Tel: [phone]\par Fax: [phone]\par

## 2021-01-12 NOTE — HISTORY OF PRESENT ILLNESS
[FreeTextEntry1] : 75 year old female former smoker with history of A-fib (on Xarelto, s/p ablation 5/2017) hyperlipidemia, and hyperthyroidism, GERD, Diabetes and left sided implanted Cardiac Loop recorder. She is referred by Dr. Gordon Marquez. As per his note: the patient had a CXR in June 2018 which revealed an anterior mediastinal mass. CT Scan of the Chest revealed bilateral pulmonary nodules. \par  \par CT Chest on 5/29/19:\par - stable 5mm RLL\par - stable 3mm LLL\par - emphysematous lung changes\par \par CT Chest on 11/7/19:\par - stable 5 mm nodule RLL\par - 3-4 mm nodule, stable \par \par PFTs on 9/18/2020: FVC 69%, FEV1 71%, DLCO 81%.\par \par CT Chest on 1/6/21:\par - stable moderate emphysema\par - stable, small lung nodules bilaterally since 12/2018: 5 x 5mm RLL (7:106); 4 x 3mm LLL (7:153); 3mm RML (7:164); 3mm RLL (7:184); 5 x 3mm RLL (7:204); 5 x 5mm RLL (7:228)\par - no new lung lesions\par \par Patient is here today for a follow up. Denies SOB, CP, cough.

## 2021-01-24 ENCOUNTER — RX RENEWAL (OUTPATIENT)
Age: 76
End: 2021-01-24

## 2021-01-27 ENCOUNTER — APPOINTMENT (OUTPATIENT)
Dept: PULMONOLOGY | Facility: CLINIC | Age: 76
End: 2021-01-27
Payer: MEDICARE

## 2021-01-27 PROCEDURE — 99214 OFFICE O/P EST MOD 30 MIN: CPT

## 2021-01-27 PROCEDURE — 99072 ADDL SUPL MATRL&STAF TM PHE: CPT

## 2021-01-27 NOTE — CONSULT LETTER
[Dear  ___] : Dear  [unfilled], [Consult Letter:] : I had the pleasure of evaluating your patient, [unfilled]. [Please see my note below.] : Please see my note below. [Sincerely,] : Sincerely, [Consult Closing:] : Thank you very much for allowing me to participate in the care of this patient.  If you have any questions, please do not hesitate to contact me. [FreeTextEntry3] : Yours truly,\par \par Gordon Marquez MD\par

## 2021-01-27 NOTE — ASSESSMENT
[FreeTextEntry1] : \par \par _____________________________\par IMMUNIZATION ASSESSMENT\par _____________________________ .  \par \par COVID \par 2021 Got 1st dose Moderna  at [facility]\par FLU \par 2021 Took 10/21/2020 \par PNEUMONIA \par 2021 Took pneumonia vaccine \par \par _____________________________\par LUNG NODULES \par _____________________________ .  \par \par HABITS \par Former 1 ppd smoker quit \par FAM\par Fam HO Ca Uterine mother  age 67 Esoph father and brother  age 51\par \par \par CT ch nc Stable moderate emphysema Smal;l bl nodules stable from 2018 no new lesions cw most recent 2019 \par \par \par CTS\par 2021 DR SAIRA LLOYD \par Dr Lloyd noted that 2021 ct chest showed stable emphysema stable bl nodules since 2018  5 mm rll 4 mm lll 3 mm rml 3 mm rll 5 mm rll \par He recommended ct in 1 y \par \par ASSESSMENT PLAN\par 2021\par CT chest planned for 2022 and see Dr Lloyd after that\par \par _____________________________\par COPD\par _____________________________ .  \par \par HABITS\par Former smoker quit  Used to smoke 3/4 ppd \par EXACERBATIONS\par SEVERE\par 2020 No  hospital stays \par MILD \par 2020 Bronchitis episode urgent care once around \par FAM  \par No asthma \par PETS \par None Used to have dog\par PFTS \par 2020 FEF 25 75 37% FEV1/FVC 71% FEV1 71% dlco 132% TLC 64% MILD RESTRICTION POSSIBLE SMALL AIRWAYS OBSTRUCTION \par \par MMRC\par 2021 MMRC 2 \par 2021 On level ground, I walk slower than people of the same age because of breathlessness,\par 2021 Gets sob with strenuous activity such as carrying grocery\par \par \par MEDS \par Symbicort 80 (2021) Uses as needed 2 times a week\par \par \par A/R\par ELEVATED IGE \par 9/10/2020  \par Former smoker quit 1 ppd 2002 \par 2021 Is using symbicort as needed and has spacer and rinses mouth with water after using it\par Is requiring symbicort once or twice a week \par Asthma seems to be \par Cont rx  \par 2021 RTC 3 munder good control\par Symbicort (2020) --> Symbicort 80 (2020) \par __________\par SLEEP APNEA\par ____________\par HISTORY\par On CPAP 9 y (2020) \par 2018 has been using CPAP for about 8 years \par 2020 Machine is not working right Wants to see sleep sspecialist as she is getting\par 2021 States she had repeat sleep study and DOES not have sleep apena \par 2021 Had 2 negv HST \par AR\par 2021 Is seeing sleep specialist and is not using cpap any longetr\par ______\par GERD\par _______\par AR\par Advised to sleep with head propped up and to not consume meals for at least 1 hour before bedtime \par 2020 is on pantoprazole \par 2021 Reflux under control\par \par ___\par A fib\par _______\par HISTORY\par on xarelto, s/p ablation in 2017), \par CARDIO\par Cardiology, Dr. Chauncey Pierre, [facility] in [facility] for Afib. \par \par \par \par

## 2021-03-02 ENCOUNTER — TRANSCRIPTION ENCOUNTER (OUTPATIENT)
Age: 76
End: 2021-03-02

## 2021-03-28 ENCOUNTER — APPOINTMENT (OUTPATIENT)
Dept: DISASTER EMERGENCY | Facility: CLINIC | Age: 76
End: 2021-03-28

## 2021-03-29 LAB — SARS-COV-2 N GENE NPH QL NAA+PROBE: NOT DETECTED

## 2021-04-27 ENCOUNTER — OUTPATIENT (OUTPATIENT)
Dept: OUTPATIENT SERVICES | Facility: HOSPITAL | Age: 76
LOS: 1 days | Discharge: HOME | End: 2021-04-27
Payer: MEDICARE

## 2021-04-27 VITALS
RESPIRATION RATE: 18 BRPM | WEIGHT: 165.57 LBS | OXYGEN SATURATION: 98 % | SYSTOLIC BLOOD PRESSURE: 120 MMHG | HEART RATE: 70 BPM | HEIGHT: 63 IN | TEMPERATURE: 98 F | DIASTOLIC BLOOD PRESSURE: 70 MMHG

## 2021-04-27 DIAGNOSIS — I48.91 UNSPECIFIED ATRIAL FIBRILLATION: ICD-10-CM

## 2021-04-27 DIAGNOSIS — Z01.818 ENCOUNTER FOR OTHER PREPROCEDURAL EXAMINATION: ICD-10-CM

## 2021-04-27 DIAGNOSIS — E11.9 TYPE 2 DIABETES MELLITUS WITHOUT COMPLICATIONS: ICD-10-CM

## 2021-04-27 DIAGNOSIS — Z98.51 TUBAL LIGATION STATUS: Chronic | ICD-10-CM

## 2021-04-27 DIAGNOSIS — M17.11 UNILATERAL PRIMARY OSTEOARTHRITIS, RIGHT KNEE: ICD-10-CM

## 2021-04-27 DIAGNOSIS — Z87.2 PERSONAL HISTORY OF DISEASES OF THE SKIN AND SUBCUTANEOUS TISSUE: Chronic | ICD-10-CM

## 2021-04-27 DIAGNOSIS — Z98.890 OTHER SPECIFIED POSTPROCEDURAL STATES: Chronic | ICD-10-CM

## 2021-04-27 DIAGNOSIS — E07.9 DISORDER OF THYROID, UNSPECIFIED: ICD-10-CM

## 2021-04-27 DIAGNOSIS — I10 ESSENTIAL (PRIMARY) HYPERTENSION: ICD-10-CM

## 2021-04-27 LAB
A1C WITH ESTIMATED AVERAGE GLUCOSE RESULT: 7.9 % — HIGH (ref 4–5.6)
ANION GAP SERPL CALC-SCNC: 9 MMOL/L — SIGNIFICANT CHANGE UP (ref 5–17)
APTT BLD: 47.3 SEC — HIGH (ref 27.5–35.5)
BLD GP AB SCN SERPL QL: SIGNIFICANT CHANGE UP
BUN SERPL-MCNC: 22 MG/DL — SIGNIFICANT CHANGE UP (ref 7–23)
CALCIUM SERPL-MCNC: 9.1 MG/DL — SIGNIFICANT CHANGE UP (ref 8.5–10.1)
CHLORIDE SERPL-SCNC: 105 MMOL/L — SIGNIFICANT CHANGE UP (ref 96–108)
CO2 SERPL-SCNC: 26 MMOL/L — SIGNIFICANT CHANGE UP (ref 22–31)
CREAT SERPL-MCNC: 1.24 MG/DL — SIGNIFICANT CHANGE UP (ref 0.5–1.3)
ESTIMATED AVERAGE GLUCOSE: 180 MG/DL — HIGH (ref 68–114)
GLUCOSE SERPL-MCNC: 147 MG/DL — HIGH (ref 70–99)
HCT VFR BLD CALC: 41.7 % — SIGNIFICANT CHANGE UP (ref 34.5–45)
HGB BLD-MCNC: 13.4 G/DL — SIGNIFICANT CHANGE UP (ref 11.5–15.5)
INR BLD: 1.96 RATIO — HIGH (ref 0.88–1.16)
MCHC RBC-ENTMCNC: 29.2 PG — SIGNIFICANT CHANGE UP (ref 27–34)
MCHC RBC-ENTMCNC: 32.1 GM/DL — SIGNIFICANT CHANGE UP (ref 32–36)
MCV RBC AUTO: 90.8 FL — SIGNIFICANT CHANGE UP (ref 80–100)
NRBC # BLD: 0 /100 WBCS — SIGNIFICANT CHANGE UP (ref 0–0)
PLATELET # BLD AUTO: 265 K/UL — SIGNIFICANT CHANGE UP (ref 150–400)
POTASSIUM SERPL-MCNC: 4.4 MMOL/L — SIGNIFICANT CHANGE UP (ref 3.5–5.3)
POTASSIUM SERPL-SCNC: 4.4 MMOL/L — SIGNIFICANT CHANGE UP (ref 3.5–5.3)
PROTHROM AB SERPL-ACNC: 22 SEC — HIGH (ref 10.6–13.6)
RBC # BLD: 4.59 M/UL — SIGNIFICANT CHANGE UP (ref 3.8–5.2)
RBC # FLD: 14.3 % — SIGNIFICANT CHANGE UP (ref 10.3–14.5)
SODIUM SERPL-SCNC: 140 MMOL/L — SIGNIFICANT CHANGE UP (ref 135–145)
WBC # BLD: 7.24 K/UL — SIGNIFICANT CHANGE UP (ref 3.8–10.5)
WBC # FLD AUTO: 7.24 K/UL — SIGNIFICANT CHANGE UP (ref 3.8–10.5)

## 2021-04-27 PROCEDURE — 93010 ELECTROCARDIOGRAM REPORT: CPT

## 2021-04-27 NOTE — H&P PST ADULT - NSICDXPROBLEM_GEN_ALL_CORE_FT
PROBLEM DIAGNOSES  Problem: Unilateral primary osteoarthritis, right knee  Assessment and Plan: Right total knee arthroplasty  labs - cbc,pt/ptt,bmp,t&s,nose cx,ekg  M/C required  cardiac clearance  preop 3 day hibiclens instruction reviewed and given .instructed on if  nose cx positive use mupuricin 5 days and checklist given  take routine meds DOS with sips of water. avoid NSAID and OTC supplements. verbalized understanding  information on proper nutrition , increase protein and better food choices provided in packet     Problem: Afib  Assessment and Plan: On Xarelto  Continue current regimen and medications.   cardiac clearance pending    Problem: HTN (hypertension)  Assessment and Plan: Continue current regimen and medications.     Problem: Thyroid disease  Assessment and Plan: Continue current regimen and medications.     Problem: DM (diabetes mellitus)  Assessment and Plan: Continue current regimen and medications.

## 2021-04-27 NOTE — PHYSICAL THERAPY INITIAL EVALUATION ADULT - MODIFIED CLINICAL TEST OF SENSORY INTEGRATION IN BALANCE TEST
30 second Sit to Stand Test: 9 reps. Functional assessment of lower extremity strength and ability to maintain balance in standing, discriminates those with low and high levels of functional activity. One Leg Stand Test (OLST): Right:1 sec Left:2 sec  Functional test to assess fall risk. Both gait and stair negotiation require components of OLST. Participants unable to perform the OLST for at least 5 seconds are at increased risk for injurious fall.

## 2021-04-27 NOTE — OCCUPATIONAL THERAPY INITIAL EVALUATION ADULT - ADDITIONAL COMMENTS
At this time, is functioning in her roles, self sufficient, driving & ambulating independently in the community without any assistive devices. Pt owns no DME. Pt is right hand dominant and wears glasses for reading. Pt c/o 8/10 pain in her right knee. The pain is exacerbated, by walking, prolonged standing, negotiating steps and is relieved with ice or extra strength Tylenol. Pt recently received and epidural infects for back pain. Pt was also receiving outpatient  Pt intervention  for right knee and palmer pain. Pt scores 90% of patient specific scale At this time, is functioning in her roles, self sufficient, driving & ambulating independently in the community without any assistive devices. Pt owns no DME. Pt is right hand dominant and wears glasses for reading. Pt c/o 8/10 pain in her right knee. The pain is exacerbated, by walking, prolonged standing, negotiating steps and is relieved with ice or extra strength Tylenol. Pt recently received an epidural injection for back pain. Pt was also receiving outpatient PT intervention  for right knee and back pain. Pt scores 90% of patient specific scale.

## 2021-04-27 NOTE — H&P PST ADULT - HISTORY OF PRESENT ILLNESS
75F pmh htn, DM, afib (on -----) c/o right knee pain 2/2 unilateral primary osteoarthritis here for PST for scheduled Right total knee arthroplasty  this patient denies any fever, cough, sob, flu like symptoms or travel outside of the [place] in the past 30 days  [facility] vaccination series completed 75F pmh htn, DM, afib (on Xarelto), hld, thyroid disease, c/o right knee pain 2/2 unilateral primary osteoarthritis here for PST for scheduled Right total knee arthroplasty  this patient denies any fever, cough, sob, flu like symptoms or travel outside of the [place] in the past 30 days  COVID vaccination series completed

## 2021-04-27 NOTE — PHYSICAL THERAPY INITIAL EVALUATION ADULT - PERTINENT HX OF CURRENT PROBLEM, REHAB EVAL
Patient attends Pre-Op Testing today following consult with Dr. Arrington due to chronic pain to right. Significant surgical/medical histories of. Elective RTKA is now scheduled in this facility for 5/11/21..

## 2021-04-27 NOTE — OCCUPATIONAL THERAPY INITIAL EVALUATION ADULT - GENERAL OBSERVATIONS, REHAB EVAL
Chart reviewed. Patient encountered seated in chair in rehab preop room in [facility]. Patient underwent occupational therapy pre-operative consultation to determine current functional ADL limitations in order to provide the right equipment for patient to perform functional ADL post operation.

## 2021-04-27 NOTE — H&P PST ADULT - NSICDXPASTSURGICALHX_GEN_ALL_CORE_FT
PAST SURGICAL HISTORY:  H/O cyst of breast surgically removed    H/O tubal ligation     History of loop recorder     S/P bunionectomy

## 2021-04-27 NOTE — OCCUPATIONAL THERAPY INITIAL EVALUATION ADULT - PERTINENT HX OF CURRENT PROBLEM, REHAB EVAL
Pt is a 74 y/o female slated for elective surgery for right TKR with MD Arrington on 5/11/21, due to OA, pain and DJD. Pt denied any  falls in the past 3-6 months

## 2021-04-27 NOTE — OCCUPATIONAL THERAPY INITIAL EVALUATION ADULT - SOCIAL CONCERNS
Pt voiced concerns about her recovery at home. Pt endorsed that her daughters and spouse will be able to assist her after discharge home post-operatively./Complex psychosocial needs/coping issues

## 2021-04-27 NOTE — OCCUPATIONAL THERAPY INITIAL EVALUATION ADULT - LIVES WITH, PROFILE
in a private house with 4 entry steps , equipped with  bilateral hand rails that cannot be reached simultaneously . Once inside, pt has to negotiate a flight of stairs with 13 steps, left ascending handrail,  to access the bedroom and bathroom. The bathroom has a tub/shower combination, grab bars, fixed shower head and standard toilet with adequate space to fit a commode over it./spouse

## 2021-04-27 NOTE — H&P PST ADULT - NSICDXPASTMEDICALHX_GEN_ALL_CORE_FT
PAST MEDICAL HISTORY:  Afib     DM (diabetes mellitus)     H/O spinal stenosis     HLD (hyperlipidemia)     HTN (hypertension)     Thyroid disease

## 2021-04-27 NOTE — PHYSICAL THERAPY INITIAL EVALUATION ADULT - ADDITIONAL COMMENTS
Patient endorses typical pain at best is 8/10, at worst is 10/10. Per patient, pain is worse with prolonged sitting and sit to stand transitions. Pain is relieved by taking tylenol and voltaren cream/lidocaine patches. Home set-up: lives with  in private house with 4 stair steps with handrails to enter at front. Bedroom is at 2nd floor with 13 stair steps to negotiate, x1 left handrail. Bathrooms to all floors of home; has a step-in shower-tub combination with grab rails, a standard height toilet seat, with fixed shower head. Endorses will be supported by family post-op. Patient is currently independent with mobility. Patient is right-handed and drives; wears glasses always. Patient denies falls in past 6 months. Denies buckling.

## 2021-04-27 NOTE — OCCUPATIONAL THERAPY INITIAL EVALUATION ADULT - RANGE OF MOTION EXAMINATION, LOWER EXTREMITY
ROM is limited in right knee due to pain/bilateral LE Passive ROM was WFL  (within functional limits)/bilateral LE Active Assistive ROM was WFL  (within functional limits)

## 2021-04-27 NOTE — H&P PST ADULT - ASSESSMENT
75F pmh htn, DM, afib (on Xarelto), hld, thyroid disease, c/o right knee pain 2/2 unilateral primary osteoarthritis here for PST for scheduled Right total knee arthroplasty  CAPRINI SCORE    AGE RELATED RISK FACTORS                                                       MOBILITY RELATED FACTORS  [ ] Age 41-60 years                                            (1 Point)                  [ ] Bed rest                                                        (1 Point)  [ ] Age: 61-74 years                                           (2 Points)                [ ] Plaster cast                                                   (2 Points)  [x ] Age= 75 years                                              (3 Points)                 [ ] Bed bound for more than 72 hours                   (2 Points)    DISEASE RELATED RISK FACTORS                                               GENDER SPECIFIC FACTORS  [ ] Edema in the lower extremities                       (1 Point)                  [ ] Pregnancy                                                     (1 Point)  [ ] Varicose veins                                               (1 Point)                  [ ] Post-partum < 6 weeks                                   (1 Point)             [x ] BMI > 25 Kg/m2                                            (1 Point)                  [ ] Hormonal therapy  or oral contraception            (1 Point)                 [ ] Sepsis (in the previous month)                        (1 Point)                  [ ] History of pregnancy complications  [ ] Pneumonia or serious lung disease                                               [ ] Unexplained or recurrent                       (1 Point)           (in the previous month)                               (1 Point)  [ ] Abnormal pulmonary function test                     (1 Point)                 SURGERY RELATED RISK FACTORS  [ ] Acute myocardial infarction                              (1 Point)                 [ ]  Section                                            (1 Point)  [ ] Congestive heart failure (in the previous month)  (1 Point)                 [ ] Minor surgery                                                 (1 Point)   [ ] Inflammatory bowel disease                             (1 Point)                 [ ] Arthroscopic surgery                                        (2 Points)  [ ] Central venous access                                    (2 Points)                [ ] General surgery lasting more than 45 minutes   (2 Points)       [ ] Stroke (in the previous month)                          (5 Points)               [x ] Elective arthroplasty                                        (5 Points)                                                                                                                                               HEMATOLOGY RELATED FACTORS                                                 TRAUMA RELATED RISK FACTORS  [ ] Prior episodes of VTE                                     (3 Points)                 [ ] Fracture of the hip, pelvis, or leg                       (5 Points)  [ ] Positive family history for VTE                         (3 Points)                 [ ] Acute spinal cord injury (in the previous month)  (5 Points)  [ ] Prothrombin 61916 A                                      (3 Points)                 [ ] Paralysis  (less than 1 month)                          (5 Points)  [ ] Factor V Leiden                                             (3 Points)                 [ ] Multiple Trauma within 1 month                         (5 Points)  [ ] Lupus anticoagulants                                     (3 Points)                                                           [ ] Anticardiolipin antibodies                                (3 Points)                                                       [ ] High homocysteine in the blood                      (3 Points)                                             [ ] Other congenital or acquired thrombophilia       (3 Points)                                                [ ] Heparin induced thrombocytopenia                  (3 Points)                                          Total Score [   9       ]

## 2021-04-28 ENCOUNTER — APPOINTMENT (OUTPATIENT)
Dept: PULMONOLOGY | Facility: CLINIC | Age: 76
End: 2021-04-28
Payer: MEDICARE

## 2021-04-28 VITALS
WEIGHT: 164 LBS | RESPIRATION RATE: 14 BRPM | HEART RATE: 73 BPM | OXYGEN SATURATION: 96 % | DIASTOLIC BLOOD PRESSURE: 77 MMHG | SYSTOLIC BLOOD PRESSURE: 132 MMHG | HEIGHT: 63.5 IN | BODY MASS INDEX: 28.7 KG/M2 | TEMPERATURE: 97.1 F

## 2021-04-28 DIAGNOSIS — Z01.818 ENCOUNTER FOR OTHER PREPROCEDURAL EXAMINATION: ICD-10-CM

## 2021-04-28 LAB
MRSA PCR RESULT.: SIGNIFICANT CHANGE UP
S AUREUS DNA NOSE QL NAA+PROBE: SIGNIFICANT CHANGE UP

## 2021-04-28 PROCEDURE — 99214 OFFICE O/P EST MOD 30 MIN: CPT

## 2021-04-28 PROCEDURE — 99072 ADDL SUPL MATRL&STAF TM PHE: CPT

## 2021-04-28 NOTE — REASON FOR VISIT
[Follow-Up] : a follow-up visit [Asthma] : asthma [Sleep Apnea] : sleep apnea [Pulmonary Nodules] : pulmonary nodules [TextBox_13] : DR NICOL NUGENT

## 2021-04-28 NOTE — ASSESSMENT
[FreeTextEntry1] : \par \par \par ___________\par PROBLEM LIST\par ______________\par     \par LUNG NODULES\par 1/6/2021 CT ch cw 12/13/2018 stable mod emphysema sm bl nodules atable no new lesions \par 11/7/2019 CT ch [facility] 222 0222 \par stable scattered multiple small 6 mm largest nodules cw 5/29/2019 and 12/132018 \par Severel increased cw 12/12/2012 ct recommended in 1 y \par \par COPD\par Former smoker quit 2002 Used to smoke 3/4 ppd \par MMRC 9/9/2020 MMRC 2\par 9/18/2020 pfts cw asthma or chr bronchitis Mild restrn possibly sec to chf\par 4/28/2021 breathing worse during pollen season \par 4/28/2021 uses symbicort 80 prn used once last weeks \par ELEVATED IGE \par 9/10/2020  \par SLEEP APNEA\par  On CPAP 9 y (9/7/2020) Stopped using CPAP 12/2020\par PSG 11/23/2020 Dr Fregoso 1 \par HST 10/23/2020 ahi 3.7 tero \par POSTNASAL DRIP\par 12/26/2018 Postnasal drip has not improved\par 4/28/2021 Uses Flonase \par \par EXTRAPULMONARY PROBLEMS  \par \par A fib\par  (on xarelto, s/p ablation in 5/2017), \par Cardiology, Dr. Chauncey Pierre, [facility] in [facility] for Afib. \par PALPITATIONS\par Has Loop recorder for palpitations.\par hld,  \par \par Hyperthyroidism, \par TSH (0.27-4.2) 9/10/2020 TSH .04\par 9/10/2020 TSH was .04 (.27-4.2 Advised to dw her PMD and see endocrine \par DM \par Endocrinologist  Dr. Delatorre  hba1c was around 8 in Dec 2017. \par GERD\par Gastroenterologist  Dr. Alberts for .\par Ophtho  Dr. Sandro Porter \par GYN  Dr. Liudmila Hess in [place].\par SPINAL STENOSIS \par 9/9/2020 gets epidural injectionsSalomón Matthews\par ___________ \par PROGRESS. \par ___________\par 4/28/2021 \par 4/28/2021 is going for knee replacement surgery Dr Mara HAN VS date 5/112021 \par 4/28/2021 asthma is under good control used symbicort 80 once last week \par 4/28/2021 post nasal drip worse since she stopped cpap \par \par ___________________\par GENERAL ASSESSMENT/RECOMMEND (A/R)\par _____________________\par \par OBESITY A/R. NOT OBESE 4/28/2021 BMI 28 \par EDS. \par SLEEP APNEA SCREEN.\par SLEEP APNEA A/R. Is using cpap \par FLU VACCINE.\par PNEUMONIA VACCINE.\par COVID VACCINE. \par Pneumonia shot 2020 \par Flu shot 10/23/2020 \par COVID moderan Jan 2021 completed both \par IMMUNIZATION A/R. Seems up to date \par LUNG CANCER SCREENING ELIGIBILITY A/R. \par Is outside eleigible ge a range \par SMOKING STATUS. Not smoking \par SMOKING INTERVENTION. \par ASSESSMENT NEED FOR HOME OXYGEN. \par Does not need home O2 \par __________________\par PREOP PULMONARY KNEE  REPLACEDMENT SCHEDULED 5/11/2021 \par ASSESSMENT RECOMMENDATIONS \par __________________\par \par Sleep apnea excluded on psg done 11/2020\par Asthma is under good control \par Lung nodules are subcm and are stable \par Patient does have persistent postnasal drip \par She is on xarelto for a fib\par No dyspnea in routine activities\par 4/28/2021 Patient seems to be in optimal pulmonary status         \par \par \par \par ____________\par LUNG NODULES\par ASSESSMENT RECOMMENDATIONS\par ______________\par \par 4/28/2021 Results if ct dw t and will plan ct ch in 1/2022\par ____\par COPD ASTHMA\par ASSESSMENT RECOMMENDATIONS \par ____________\par \par 4/28/2021 Asthma seems to be under good control\par Cont symbicort 80 1p q 4 h prn \par 4/28/2021 RTC 3 m \par ___________\par SLEEP APNEA\par ASSESSMENT RECOMMENDATIONS\par ___________\par \par 4/28/2021 Sleep apnea ruled out on psg done end 2020 Pt now not using cap\par \par ______________\par POSTNASAL DRIP \par _____________\par SUBJECTIVE \par 12/26/2018 Postnasal drip has not improved \par 4/28/2021 Post nasal drip worse since she stopped using cpap \par \par ASSESSMENT/RECOMMENDATIONS  \par 4/28/2021 Continue flonase \par \par \par

## 2021-05-08 ENCOUNTER — APPOINTMENT (OUTPATIENT)
Dept: DISASTER EMERGENCY | Facility: CLINIC | Age: 76
End: 2021-05-08

## 2021-05-10 ENCOUNTER — TRANSCRIPTION ENCOUNTER (OUTPATIENT)
Age: 76
End: 2021-05-10

## 2021-05-10 LAB — SARS-COV-2 N GENE NPH QL NAA+PROBE: NOT DETECTED

## 2021-05-10 RX ORDER — HYDROMORPHONE HYDROCHLORIDE 2 MG/ML
0.5 INJECTION INTRAMUSCULAR; INTRAVENOUS; SUBCUTANEOUS ONCE
Refills: 0 | Status: DISCONTINUED | OUTPATIENT
Start: 2021-05-11 | End: 2021-05-12

## 2021-05-10 RX ORDER — SODIUM CHLORIDE 9 MG/ML
1000 INJECTION, SOLUTION INTRAVENOUS
Refills: 0 | Status: DISCONTINUED | OUTPATIENT
Start: 2021-05-11 | End: 2021-05-12

## 2021-05-10 RX ORDER — ONDANSETRON 8 MG/1
4 TABLET, FILM COATED ORAL EVERY 6 HOURS
Refills: 0 | Status: DISCONTINUED | OUTPATIENT
Start: 2021-05-11 | End: 2021-05-12

## 2021-05-10 RX ORDER — ATORVASTATIN CALCIUM 80 MG/1
20 TABLET, FILM COATED ORAL AT BEDTIME
Refills: 0 | Status: DISCONTINUED | OUTPATIENT
Start: 2021-05-11 | End: 2021-05-12

## 2021-05-10 RX ORDER — INSULIN LISPRO 100/ML
VIAL (ML) SUBCUTANEOUS
Refills: 0 | Status: DISCONTINUED | OUTPATIENT
Start: 2021-05-11 | End: 2021-05-12

## 2021-05-10 RX ORDER — SODIUM CHLORIDE 9 MG/ML
1000 INJECTION, SOLUTION INTRAVENOUS
Refills: 0 | Status: DISCONTINUED | OUTPATIENT
Start: 2021-05-12 | End: 2021-05-12

## 2021-05-10 RX ORDER — DEXTROSE 50 % IN WATER 50 %
25 SYRINGE (ML) INTRAVENOUS ONCE
Refills: 0 | Status: DISCONTINUED | OUTPATIENT
Start: 2021-05-11 | End: 2021-05-12

## 2021-05-10 RX ORDER — METOPROLOL TARTRATE 50 MG
200 TABLET ORAL DAILY
Refills: 0 | Status: DISCONTINUED | OUTPATIENT
Start: 2021-05-11 | End: 2021-05-12

## 2021-05-10 RX ORDER — DEXTROSE 50 % IN WATER 50 %
15 SYRINGE (ML) INTRAVENOUS ONCE
Refills: 0 | Status: DISCONTINUED | OUTPATIENT
Start: 2021-05-11 | End: 2021-05-12

## 2021-05-10 RX ORDER — PANTOPRAZOLE SODIUM 20 MG/1
40 TABLET, DELAYED RELEASE ORAL
Refills: 0 | Status: DISCONTINUED | OUTPATIENT
Start: 2021-05-11 | End: 2021-05-12

## 2021-05-10 RX ORDER — FERROUS SULFATE 325(65) MG
325 TABLET ORAL DAILY
Refills: 0 | Status: DISCONTINUED | OUTPATIENT
Start: 2021-05-11 | End: 2021-05-12

## 2021-05-10 RX ORDER — DEXTROSE 50 % IN WATER 50 %
12.5 SYRINGE (ML) INTRAVENOUS ONCE
Refills: 0 | Status: DISCONTINUED | OUTPATIENT
Start: 2021-05-11 | End: 2021-05-12

## 2021-05-10 RX ORDER — BENZOCAINE AND MENTHOL 5; 1 G/100ML; G/100ML
1 LIQUID ORAL
Refills: 0 | Status: DISCONTINUED | OUTPATIENT
Start: 2021-05-11 | End: 2021-05-12

## 2021-05-10 RX ORDER — INSULIN LISPRO 100/ML
VIAL (ML) SUBCUTANEOUS AT BEDTIME
Refills: 0 | Status: DISCONTINUED | OUTPATIENT
Start: 2021-05-11 | End: 2021-05-12

## 2021-05-10 RX ORDER — ACETAMINOPHEN 500 MG
975 TABLET ORAL EVERY 8 HOURS
Refills: 0 | Status: DISCONTINUED | OUTPATIENT
Start: 2021-05-11 | End: 2021-05-12

## 2021-05-10 RX ORDER — GLUCAGON INJECTION, SOLUTION 0.5 MG/.1ML
1 INJECTION, SOLUTION SUBCUTANEOUS ONCE
Refills: 0 | Status: DISCONTINUED | OUTPATIENT
Start: 2021-05-11 | End: 2021-05-12

## 2021-05-10 RX ORDER — SENNA PLUS 8.6 MG/1
2 TABLET ORAL AT BEDTIME
Refills: 0 | Status: DISCONTINUED | OUTPATIENT
Start: 2021-05-11 | End: 2021-05-12

## 2021-05-10 RX ORDER — MAGNESIUM HYDROXIDE 400 MG/1
30 TABLET, CHEWABLE ORAL DAILY
Refills: 0 | Status: DISCONTINUED | OUTPATIENT
Start: 2021-05-11 | End: 2021-05-12

## 2021-05-10 RX ORDER — VERAPAMIL HCL 240 MG
180 CAPSULE, EXTENDED RELEASE PELLETS 24 HR ORAL DAILY
Refills: 0 | Status: DISCONTINUED | OUTPATIENT
Start: 2021-05-11 | End: 2021-05-12

## 2021-05-10 RX ORDER — LANOLIN ALCOHOL/MO/W.PET/CERES
3 CREAM (GRAM) TOPICAL AT BEDTIME
Refills: 0 | Status: DISCONTINUED | OUTPATIENT
Start: 2021-05-11 | End: 2021-05-12

## 2021-05-10 RX ORDER — FOLIC ACID 0.8 MG
1 TABLET ORAL DAILY
Refills: 0 | Status: DISCONTINUED | OUTPATIENT
Start: 2021-05-11 | End: 2021-05-12

## 2021-05-11 ENCOUNTER — INPATIENT (INPATIENT)
Facility: HOSPITAL | Age: 76
LOS: 0 days | Discharge: HOME HEALTH | End: 2021-05-12
Attending: ORTHOPAEDIC SURGERY | Admitting: ORTHOPAEDIC SURGERY
Payer: MEDICARE

## 2021-05-11 ENCOUNTER — RESULT REVIEW (OUTPATIENT)
Age: 76
End: 2021-05-11

## 2021-05-11 ENCOUNTER — TRANSCRIPTION ENCOUNTER (OUTPATIENT)
Age: 76
End: 2021-05-11

## 2021-05-11 VITALS
TEMPERATURE: 98 F | OXYGEN SATURATION: 97 % | WEIGHT: 164.91 LBS | DIASTOLIC BLOOD PRESSURE: 72 MMHG | HEART RATE: 61 BPM | RESPIRATION RATE: 18 BRPM | HEIGHT: 64 IN | SYSTOLIC BLOOD PRESSURE: 129 MMHG

## 2021-05-11 DIAGNOSIS — Z98.51 TUBAL LIGATION STATUS: Chronic | ICD-10-CM

## 2021-05-11 DIAGNOSIS — Z98.890 OTHER SPECIFIED POSTPROCEDURAL STATES: Chronic | ICD-10-CM

## 2021-05-11 DIAGNOSIS — Z87.2 PERSONAL HISTORY OF DISEASES OF THE SKIN AND SUBCUTANEOUS TISSUE: Chronic | ICD-10-CM

## 2021-05-11 LAB
APTT BLD: 34.7 SEC — SIGNIFICANT CHANGE UP (ref 27.5–35.5)
GLUCOSE BLDC GLUCOMTR-MCNC: 154 MG/DL — HIGH (ref 70–99)
GLUCOSE BLDC GLUCOMTR-MCNC: 154 MG/DL — HIGH (ref 70–99)
GLUCOSE BLDC GLUCOMTR-MCNC: 158 MG/DL — HIGH (ref 70–99)
GLUCOSE BLDC GLUCOMTR-MCNC: 256 MG/DL — HIGH (ref 70–99)
GLUCOSE BLDC GLUCOMTR-MCNC: 320 MG/DL — HIGH (ref 70–99)
HCT VFR BLD CALC: 35 % — SIGNIFICANT CHANGE UP (ref 34.5–45)
HGB BLD-MCNC: 11.2 G/DL — LOW (ref 11.5–15.5)
INR BLD: 1.05 RATIO — SIGNIFICANT CHANGE UP (ref 0.88–1.16)
MCHC RBC-ENTMCNC: 29.3 PG — SIGNIFICANT CHANGE UP (ref 27–34)
MCHC RBC-ENTMCNC: 32 GM/DL — SIGNIFICANT CHANGE UP (ref 32–36)
MCV RBC AUTO: 91.6 FL — SIGNIFICANT CHANGE UP (ref 80–100)
NRBC # BLD: 0 /100 WBCS — SIGNIFICANT CHANGE UP (ref 0–0)
PLATELET # BLD AUTO: 197 K/UL — SIGNIFICANT CHANGE UP (ref 150–400)
PROTHROM AB SERPL-ACNC: 12.2 SEC — SIGNIFICANT CHANGE UP (ref 10.6–13.6)
RBC # BLD: 3.82 M/UL — SIGNIFICANT CHANGE UP (ref 3.8–5.2)
RBC # FLD: 14.3 % — SIGNIFICANT CHANGE UP (ref 10.3–14.5)
WBC # BLD: 6.82 K/UL — SIGNIFICANT CHANGE UP (ref 3.8–10.5)
WBC # FLD AUTO: 6.82 K/UL — SIGNIFICANT CHANGE UP (ref 3.8–10.5)

## 2021-05-11 PROCEDURE — 88305 TISSUE EXAM BY PATHOLOGIST: CPT | Mod: 26

## 2021-05-11 PROCEDURE — 88311 DECALCIFY TISSUE: CPT | Mod: 26

## 2021-05-11 PROCEDURE — 73560 X-RAY EXAM OF KNEE 1 OR 2: CPT | Mod: 26,RT

## 2021-05-11 RX ORDER — SODIUM CHLORIDE 9 MG/ML
1000 INJECTION, SOLUTION INTRAVENOUS
Refills: 0 | Status: DISCONTINUED | OUTPATIENT
Start: 2021-05-11 | End: 2021-05-11

## 2021-05-11 RX ORDER — OXYCODONE HYDROCHLORIDE 5 MG/1
5 TABLET ORAL
Refills: 0 | Status: DISCONTINUED | OUTPATIENT
Start: 2021-05-11 | End: 2021-05-12

## 2021-05-11 RX ORDER — SODIUM CHLORIDE 9 MG/ML
1500 INJECTION, SOLUTION INTRAVENOUS ONCE
Refills: 0 | Status: COMPLETED | OUTPATIENT
Start: 2021-05-11 | End: 2021-05-11

## 2021-05-11 RX ORDER — ACETAMINOPHEN 500 MG
0 TABLET ORAL
Qty: 0 | Refills: 0 | DISCHARGE

## 2021-05-11 RX ORDER — INSULIN ASPART 100 [IU]/ML
0 INJECTION, SOLUTION SUBCUTANEOUS
Qty: 0 | Refills: 0 | DISCHARGE

## 2021-05-11 RX ORDER — METFORMIN HYDROCHLORIDE 850 MG/1
500 TABLET ORAL
Refills: 0 | Status: DISCONTINUED | OUTPATIENT
Start: 2021-05-11 | End: 2021-05-12

## 2021-05-11 RX ORDER — RIVAROXABAN 15 MG-20MG
1 KIT ORAL
Qty: 0 | Refills: 0 | DISCHARGE

## 2021-05-11 RX ORDER — METFORMIN HYDROCHLORIDE 850 MG/1
1000 TABLET ORAL
Refills: 0 | Status: DISCONTINUED | OUTPATIENT
Start: 2021-05-11 | End: 2021-05-11

## 2021-05-11 RX ORDER — DEXAMETHASONE 0.5 MG/5ML
4 ELIXIR ORAL ONCE
Refills: 0 | Status: COMPLETED | OUTPATIENT
Start: 2021-05-12 | End: 2021-05-12

## 2021-05-11 RX ORDER — ACETAMINOPHEN 500 MG
650 TABLET ORAL ONCE
Refills: 0 | Status: COMPLETED | OUTPATIENT
Start: 2021-05-11 | End: 2021-05-11

## 2021-05-11 RX ORDER — ONDANSETRON 8 MG/1
4 TABLET, FILM COATED ORAL ONCE
Refills: 0 | Status: DISCONTINUED | OUTPATIENT
Start: 2021-05-11 | End: 2021-05-11

## 2021-05-11 RX ORDER — OLMESARTAN MEDOXOMIL-HYDROCHLOROTHIAZIDE 25; 40 MG/1; MG/1
1 TABLET, FILM COATED ORAL
Qty: 0 | Refills: 0 | DISCHARGE

## 2021-05-11 RX ORDER — METFORMIN HYDROCHLORIDE 850 MG/1
2 TABLET ORAL
Qty: 0 | Refills: 0 | DISCHARGE

## 2021-05-11 RX ORDER — LOSARTAN POTASSIUM 100 MG/1
100 TABLET, FILM COATED ORAL DAILY
Refills: 0 | Status: DISCONTINUED | OUTPATIENT
Start: 2021-05-11 | End: 2021-05-12

## 2021-05-11 RX ORDER — HYDROCHLOROTHIAZIDE 25 MG
25 TABLET ORAL DAILY
Refills: 0 | Status: DISCONTINUED | OUTPATIENT
Start: 2021-05-11 | End: 2021-05-12

## 2021-05-11 RX ORDER — INSULIN DEGLUDEC 100 U/ML
30 INJECTION, SOLUTION SUBCUTANEOUS
Qty: 0 | Refills: 0 | DISCHARGE

## 2021-05-11 RX ORDER — FENTANYL CITRATE 50 UG/ML
25 INJECTION INTRAVENOUS
Refills: 0 | Status: DISCONTINUED | OUTPATIENT
Start: 2021-05-11 | End: 2021-05-11

## 2021-05-11 RX ORDER — ROSUVASTATIN CALCIUM 5 MG/1
1 TABLET ORAL
Qty: 0 | Refills: 0 | DISCHARGE

## 2021-05-11 RX ORDER — SODIUM CHLORIDE 9 MG/ML
3 INJECTION INTRAMUSCULAR; INTRAVENOUS; SUBCUTANEOUS EVERY 8 HOURS
Refills: 0 | Status: DISCONTINUED | OUTPATIENT
Start: 2021-05-11 | End: 2021-05-11

## 2021-05-11 RX ORDER — FENTANYL CITRATE 50 UG/ML
50 INJECTION INTRAVENOUS ONCE
Refills: 0 | Status: DISCONTINUED | OUTPATIENT
Start: 2021-05-11 | End: 2021-05-11

## 2021-05-11 RX ORDER — CEFAZOLIN SODIUM 1 G
2000 VIAL (EA) INJECTION EVERY 8 HOURS
Refills: 0 | Status: COMPLETED | OUTPATIENT
Start: 2021-05-11 | End: 2021-05-12

## 2021-05-11 RX ORDER — RIVAROXABAN 15 MG-20MG
10 KIT ORAL DAILY
Refills: 0 | Status: DISCONTINUED | OUTPATIENT
Start: 2021-05-12 | End: 2021-05-12

## 2021-05-11 RX ORDER — METOPROLOL TARTRATE 50 MG
1 TABLET ORAL
Qty: 0 | Refills: 0 | DISCHARGE

## 2021-05-11 RX ORDER — OXYCODONE HYDROCHLORIDE 5 MG/1
10 TABLET ORAL
Refills: 0 | Status: DISCONTINUED | OUTPATIENT
Start: 2021-05-11 | End: 2021-05-12

## 2021-05-11 RX ORDER — VERAPAMIL HCL 240 MG
1 CAPSULE, EXTENDED RELEASE PELLETS 24 HR ORAL
Qty: 0 | Refills: 0 | DISCHARGE

## 2021-05-11 RX ORDER — METHIMAZOLE 10 MG/1
1 TABLET ORAL
Qty: 0 | Refills: 0 | DISCHARGE

## 2021-05-11 RX ORDER — CELECOXIB 200 MG/1
200 CAPSULE ORAL ONCE
Refills: 0 | Status: COMPLETED | OUTPATIENT
Start: 2021-05-11 | End: 2021-05-11

## 2021-05-11 RX ORDER — ACETAMINOPHEN 500 MG
1000 TABLET ORAL ONCE
Refills: 0 | Status: COMPLETED | OUTPATIENT
Start: 2021-05-11 | End: 2021-05-11

## 2021-05-11 RX ORDER — DULAGLUTIDE 4.5 MG/.5ML
0 INJECTION, SOLUTION SUBCUTANEOUS
Qty: 0 | Refills: 0 | DISCHARGE

## 2021-05-11 RX ADMIN — OXYCODONE HYDROCHLORIDE 10 MILLIGRAM(S): 5 TABLET ORAL at 23:07

## 2021-05-11 RX ADMIN — SODIUM CHLORIDE 100 MILLILITER(S): 9 INJECTION, SOLUTION INTRAVENOUS at 13:58

## 2021-05-11 RX ADMIN — Medication 100 MILLIGRAM(S): at 18:50

## 2021-05-11 RX ADMIN — SODIUM CHLORIDE 3 MILLILITER(S): 9 INJECTION INTRAMUSCULAR; INTRAVENOUS; SUBCUTANEOUS at 09:36

## 2021-05-11 RX ADMIN — Medication 400 MILLIGRAM(S): at 20:07

## 2021-05-11 RX ADMIN — SODIUM CHLORIDE 750 MILLILITER(S): 9 INJECTION, SOLUTION INTRAVENOUS at 15:53

## 2021-05-11 RX ADMIN — Medication 4: at 22:15

## 2021-05-11 RX ADMIN — SENNA PLUS 2 TABLET(S): 8.6 TABLET ORAL at 22:07

## 2021-05-11 RX ADMIN — ATORVASTATIN CALCIUM 20 MILLIGRAM(S): 80 TABLET, FILM COATED ORAL at 22:07

## 2021-05-11 RX ADMIN — CELECOXIB 200 MILLIGRAM(S): 200 CAPSULE ORAL at 09:35

## 2021-05-11 RX ADMIN — OXYCODONE HYDROCHLORIDE 10 MILLIGRAM(S): 5 TABLET ORAL at 22:07

## 2021-05-11 RX ADMIN — Medication 1000 MILLIGRAM(S): at 20:45

## 2021-05-11 RX ADMIN — Medication 650 MILLIGRAM(S): at 09:35

## 2021-05-11 RX ADMIN — BENZOCAINE AND MENTHOL 1 LOZENGE: 5; 1 LIQUID ORAL at 22:15

## 2021-05-11 RX ADMIN — Medication 3 MILLIGRAM(S): at 22:07

## 2021-05-11 NOTE — PHYSICAL THERAPY INITIAL EVALUATION ADULT - RISK REDUCTION/PREVENTION, PT EVAL
No changes in H&P, ASA 2, Airway class  2.  The plans for this patient is for moderate sedation with benzodiazepine namely midazolam and opioid namely fentany. The patient's history and physical has been documented and reviewed. The patient is suitable to undertake sedation. The risks , benefits as well as alternatives have been discussed with the patient/decision-maker.             Electronically Signed On 06.25.2019 09:36  ___________________________________________________   Hayden MOON, Iban Chávez     risk factors

## 2021-05-11 NOTE — DISCHARGE NOTE PROVIDER - CARE PROVIDER_API CALL
Jurgen Arrington)  Orthopaedic Surgery  [address] [place] [address]  Phone: [phone]  Fax: [phone]  Follow Up Time:

## 2021-05-11 NOTE — PHYSICAL THERAPY INITIAL EVALUATION ADULT - CRITERIA FOR SKILLED THERAPEUTIC INTERVENTIONS
Home with home PT, Pt has DME/impairments found/functional limitations in following categories/risk reduction/prevention/rehab potential/therapy frequency/predicted duration of therapy intervention/anticipated equipment needs at discharge/anticipated discharge recommendation

## 2021-05-11 NOTE — OCCUPATIONAL THERAPY INITIAL EVALUATION ADULT - GENERAL OBSERVATIONS, REHAB EVAL
Patient encountered supine in bed, NAD, A&Ox4, WBAT RLE, +cardiac monitor, +pneumatic TEDs, + ace wrap on R knee C/D/I. Pt c/o pain in R knee 7/10 at rest & w/movement s/p R TKA.

## 2021-05-11 NOTE — PHYSICAL THERAPY INITIAL EVALUATION ADULT - PLANNED THERAPY INTERVENTIONS, PT EVAL
Pt will be able to negotiate 17 steps using left rail up, right sided cane, independently in  2 to 3 days/balance training/bed mobility training/gait training/strengthening/transfer training

## 2021-05-11 NOTE — PHYSICAL THERAPY INITIAL EVALUATION ADULT - ADDITIONAL COMMENTS
Verified postop, Patient endorses typical pain at best is 8/10, at worst is 10/10. Per patient, pain is worse with prolonged sitting and sit to stand transitions. Pain is relieved by taking tylenol and voltaren cream/lidocaine patches. Home set-up: lives with  in private house with 4 stair steps with handrails to enter at front. Bedroom is at 2nd floor with 13 stair steps to negotiate, x1 left handrail. Bathrooms to all floors of home; has a step-in shower-tub combination with grab rails, a standard height toilet seat, with fixed shower head. Endorses will be supported by family post-op. Patient is currently independent with mobility. Patient is right-handed and drives; wears glasses always. Patient denies falls in past 6 months. Denies buckling.   Post op, pt stated at entry steps bilateral rails wide apart, left is the one reachable.

## 2021-05-11 NOTE — DISCHARGE NOTE PROVIDER - NSDCMRMEDTOKEN_GEN_ALL_CORE_FT
metFORMIN 500 mg oral tablet, extended release: 2 tab(s) orally 2 times a day  methIMAzole 10 mg oral tablet: 1 tab(s) orally every other day  metoprolol succinate 200 mg oral capsule, extended release: 1 cap(s) orally once a day  NovoLOG 100 units/mL injectable solution:   olmesartan-hydrochlorothiazide 40 mg-25 mg oral tablet: 1 tab(s) orally once a day  pantoprazole 40 mg oral delayed release tablet: 1 tab(s) orally once a day  rosuvastatin 5 mg oral tablet: 1 tab(s) orally once a day  Tresiba 100 units/mL subcutaneous solution: 30 unit(s) subcutaneous once a day  Trulicity Pen 1.5 mg/0.5 mL subcutaneous solution: subcutaneous once a week  Tylenol Arthritis Extended Release 650 mg oral tablet, extended release:   verapamil 180 mg/24 hours oral capsule, extended release: 1 cap(s) orally once a day  Xarelto 20 mg oral tablet: 1 tab(s) orally once a day (in the evening)   metFORMIN 500 mg oral tablet, extended release: 2 tab(s) orally 2 times a day  methIMAzole 10 mg oral tablet: 1 tab(s) orally every other day  metoprolol succinate 200 mg oral capsule, extended release: 1 cap(s) orally once a day  Multiple Vitamins oral tablet: 1 tab(s) orally once a day  NovoLOG 100 units/mL injectable solution:   olmesartan-hydrochlorothiazide 40 mg-25 mg oral tablet: 1 tab(s) orally once a day  pantoprazole 40 mg oral delayed release tablet: 1 tab(s) orally once a day (before a meal) MDD:1  rosuvastatin 5 mg oral tablet: 1 tab(s) orally once a day  senna oral tablet: 2 tab(s) orally once a day (at bedtime)  traMADol 50 mg oral tablet: 1-2 tab(s) orally every 4 to 6 hours, As Needed -for pain. Do not exceed 400mg in 24 hours MDD:8   Tresiba 100 units/mL subcutaneous solution: 30 unit(s) subcutaneous once a day  Trulicity Pen 1.5 mg/0.5 mL subcutaneous solution: subcutaneous once a week  Tylenol Arthritis Extended Release 650 mg oral tablet, extended release:   verapamil 180 mg/24 hours oral capsule, extended release: 1 cap(s) orally once a day  Xarelto 20 mg oral tablet: 1 tab(s) orally once a day (in the evening)

## 2021-05-11 NOTE — OCCUPATIONAL THERAPY INITIAL EVALUATION ADULT - ADDITIONAL COMMENTS
Pre-op confirmed: Patient lives with spouse in a private house with 4 entry steps , equipped with  bilateral hand rails that cannot be reached simultaneously . Once inside, pt has to negotiate a flight of stairs with 13 steps, left ascending handrail,  to access the bedroom and bathroom. The bathroom has a tub/shower combination, grab bars, fixed shower head and standard toilet with adequate space to fit a commode over it. At this time, is functioning in her roles, self sufficient, driving & ambulating independently in the community without any assistive devices. Pt is right hand dominant and wears glasses for reading.

## 2021-05-11 NOTE — DISCHARGE NOTE PROVIDER - NSDCFUADDAPPT_GEN_ALL_CORE_FT
Follow up with your surgeon in two weeks. Call for appointment.  If you need more pain medication, call your surgeon's office. For medication refills or authorizations, please call [phone]  We recommend that you call and schedule a follow up appointment within 2-4 weeks with your primary care physician for repeat blood work (CBC and BMP) for post hospital discharge follow-up care.  Call your surgeon if you have increased redness/pain/drainage or fever. Return to ER for shortness of breath/calf tenderness.

## 2021-05-11 NOTE — CONSULT NOTE ADULT - SUBJECTIVE AND OBJECTIVE BOX
MARGA VÁZQUEZ is a 75y Female s/p RIGHT TOTAL KNEE ARTHROPLASTY  by Dr. Arrington on 5/11/21; complains of postop pain; patient tolerated surgery well.    PMH: w/ h/o Afib  HTN (hypertension)  DM (diabetes mellitus)  HLD (hyperlipidemia)  Thyroid disease  H/O spinal stenosis    ROS: no fevers, chills, headache, dizziness, lightheadedness, chest pain, palpitations, shortness of breath, cough, phlegm, wheezing, abdominal pain, nausea, vomiting, diarrhea, constipation or urinary symptoms     PSH: S/P bunionectomy  H/O tubal ligation  H/O cyst of breast  History of loop recorder    SH: does not smoke or drink at this time    ALLERGIES: penicillin (Hives)    PHYS: acetaminophen   Tablet .. 975 milliGRAM(s) Oral every 8 hours  acetaminophen  IVPB .. 1000 milliGRAM(s) IV Intermittent once  atorvastatin 20 milliGRAM(s) Oral at bedtime  benzocaine 15 mG/menthol 3.6 mG (Sugar-Free) Lozenge 1 Lozenge Oral four times a day PRN  ceFAZolin   IVPB 2000 milliGRAM(s) IV Intermittent every 8 hours  dextrose 40% Gel 15 Gram(s) Oral once  dextrose 5%. 1000 milliLiter(s) IV Continuous <Continuous>  dextrose 5%. 1000 milliLiter(s) IV Continuous <Continuous>  dextrose 50% Injectable 25 Gram(s) IV Push once  dextrose 50% Injectable 12.5 Gram(s) IV Push once  dextrose 50% Injectable 25 Gram(s) IV Push once  ferrous    sulfate 325 milliGRAM(s) Oral daily  folic acid 1 milliGRAM(s) Oral daily  glucagon  Injectable 1 milliGRAM(s) IntraMuscular once  hydrochlorothiazide 25 milliGRAM(s) Oral daily  HYDROmorphone  Injectable 0.5 milliGRAM(s) IV Push once PRN  insulin lispro (ADMELOG) corrective regimen sliding scale   SubCutaneous three times a day before meals  insulin lispro (ADMELOG) corrective regimen sliding scale   SubCutaneous at bedtime  losartan 100 milliGRAM(s) Oral daily  magnesium hydroxide Suspension 30 milliLiter(s) Oral daily PRN  melatonin 3 milliGRAM(s) Oral at bedtime PRN  metFORMIN 500 milliGRAM(s) Oral two times a day  methimazole 10 milliGRAM(s) Oral daily  metoprolol succinate  milliGRAM(s) Oral daily  multivitamin 1 Tablet(s) Oral daily  ondansetron Injectable 4 milliGRAM(s) IV Push every 6 hours PRN  oxyCODONE    IR 5 milliGRAM(s) Oral every 3 hours PRN  oxyCODONE    IR 10 milliGRAM(s) Oral every 3 hours PRN  pantoprazole    Tablet 40 milliGRAM(s) Oral before breakfast  senna 2 Tablet(s) Oral at bedtime  verapamil  milliGRAM(s) Oral daily    PHYS: T(C): 36.3 (05-11-21 @ 19:10), Max: 36.8 (05-11-21 @ 08:59)  HR: 66 (05-11-21 @ 19:10) (52 - 66)  BP: 134/72 (05-11-21 @ 19:10) (82/46 - 134/72)  RR: 18 (05-11-21 @ 19:10) (15 - 22)  SpO2: 95% (05-11-21 @ 19:10) (95% - 100%)  HEENT unremarkable  neck no JVD or bruit  lungs, clear bilaterally  heart, regular rhythm, normal S1, S2, no murmurs, rubs or gallops  abdomen, soft, non tender, no organomegaly, normal bowel sounds  no cyanosis, clubbing, edema or calf tenderness  neuro, grossly normal                        11.2   6.82  )-----------( 197      ( 11 May 2021 13:41 )             35.0     Assessment and Plan: status post right total knee replacement; diabetes mellitus (A1c=7.0); Hypertension; hyperlipidemia; Atrial fibrillation; hypothyroid; spinal stenosis; post op hypotension (corrected in PACU); obesity (BMI=31.9); pain control; deep vein thrombophlebitis prophylaxis; physical therapy; bowel regimen; nutrition support; follow up labs; will follow.

## 2021-05-11 NOTE — DISCHARGE NOTE PROVIDER - HOSPITAL COURSE
75yFemale with history of Right knee OA presenting for R TKA by Dr. Arrington on 5/11/21. Risk and benefits of surgery were explained to the patient. The patient understood and agreed to proceed with surgery. Patient underwent the procedure with no intraoperative complications. Pt was brought in stable condition to the PACU. Once stable in PACU, pt was brought to the floor. During hospital stay pt was followed by Medicine, physical therapy, Home Care during this admission. Pt had an uneventful hospital course. Pt is stable for discharge to home 75yFemale with history of Right knee OA presenting for R TKA by Dr. Arrington on 5/11/21. Risk and benefits of surgery were explained to the patient. The patient understood and agreed to proceed with surgery. Patient underwent the procedure with no intraoperative complications. Pt was brought in stable condition to the PACU. Once stable in PACU, pt was brought to the floor. During hospital stay pt was followed by Medicine, physical therapy, Home Care during this admission. Pt had an uneventful hospital course. Pt is stable for discharge to home POD#1

## 2021-05-12 ENCOUNTER — TRANSCRIPTION ENCOUNTER (OUTPATIENT)
Age: 76
End: 2021-05-12

## 2021-05-12 VITALS
RESPIRATION RATE: 16 BRPM | TEMPERATURE: 98 F | HEART RATE: 69 BPM | OXYGEN SATURATION: 96 % | SYSTOLIC BLOOD PRESSURE: 117 MMHG | DIASTOLIC BLOOD PRESSURE: 75 MMHG

## 2021-05-12 LAB
ANION GAP SERPL CALC-SCNC: 9 MMOL/L — SIGNIFICANT CHANGE UP (ref 5–17)
BUN SERPL-MCNC: 22 MG/DL — SIGNIFICANT CHANGE UP (ref 7–23)
CALCIUM SERPL-MCNC: 8.2 MG/DL — LOW (ref 8.5–10.1)
CHLORIDE SERPL-SCNC: 105 MMOL/L — SIGNIFICANT CHANGE UP (ref 96–108)
CO2 SERPL-SCNC: 23 MMOL/L — SIGNIFICANT CHANGE UP (ref 22–31)
CREAT SERPL-MCNC: 1.2 MG/DL — SIGNIFICANT CHANGE UP (ref 0.5–1.3)
GLUCOSE BLDC GLUCOMTR-MCNC: 225 MG/DL — HIGH (ref 70–99)
GLUCOSE BLDC GLUCOMTR-MCNC: 241 MG/DL — HIGH (ref 70–99)
GLUCOSE SERPL-MCNC: 239 MG/DL — HIGH (ref 70–99)
HCT VFR BLD CALC: 36.3 % — SIGNIFICANT CHANGE UP (ref 34.5–45)
HGB BLD-MCNC: 11.7 G/DL — SIGNIFICANT CHANGE UP (ref 11.5–15.5)
MCHC RBC-ENTMCNC: 28.9 PG — SIGNIFICANT CHANGE UP (ref 27–34)
MCHC RBC-ENTMCNC: 32.2 GM/DL — SIGNIFICANT CHANGE UP (ref 32–36)
MCV RBC AUTO: 89.6 FL — SIGNIFICANT CHANGE UP (ref 80–100)
NRBC # BLD: 0 /100 WBCS — SIGNIFICANT CHANGE UP (ref 0–0)
PLATELET # BLD AUTO: 217 K/UL — SIGNIFICANT CHANGE UP (ref 150–400)
POTASSIUM SERPL-MCNC: 4.3 MMOL/L — SIGNIFICANT CHANGE UP (ref 3.5–5.3)
POTASSIUM SERPL-SCNC: 4.3 MMOL/L — SIGNIFICANT CHANGE UP (ref 3.5–5.3)
RBC # BLD: 4.05 M/UL — SIGNIFICANT CHANGE UP (ref 3.8–5.2)
RBC # FLD: 13.8 % — SIGNIFICANT CHANGE UP (ref 10.3–14.5)
SODIUM SERPL-SCNC: 137 MMOL/L — SIGNIFICANT CHANGE UP (ref 135–145)
WBC # BLD: 12.74 K/UL — HIGH (ref 3.8–10.5)
WBC # FLD AUTO: 12.74 K/UL — HIGH (ref 3.8–10.5)

## 2021-05-12 RX ORDER — PANTOPRAZOLE SODIUM 20 MG/1
1 TABLET, DELAYED RELEASE ORAL
Qty: 0 | Refills: 0 | DISCHARGE

## 2021-05-12 RX ORDER — SODIUM CHLORIDE 9 MG/ML
1000 INJECTION INTRAMUSCULAR; INTRAVENOUS; SUBCUTANEOUS ONCE
Refills: 0 | Status: COMPLETED | OUTPATIENT
Start: 2021-05-12 | End: 2021-05-12

## 2021-05-12 RX ORDER — LOSARTAN POTASSIUM 100 MG/1
50 TABLET, FILM COATED ORAL DAILY
Refills: 0 | Status: DISCONTINUED | OUTPATIENT
Start: 2021-05-12 | End: 2021-05-12

## 2021-05-12 RX ORDER — TRAMADOL HYDROCHLORIDE 50 MG/1
50 TABLET ORAL EVERY 4 HOURS
Refills: 0 | Status: DISCONTINUED | OUTPATIENT
Start: 2021-05-12 | End: 2021-05-12

## 2021-05-12 RX ORDER — PANTOPRAZOLE SODIUM 20 MG/1
1 TABLET, DELAYED RELEASE ORAL
Qty: 30 | Refills: 0
Start: 2021-05-12 | End: 2021-06-10

## 2021-05-12 RX ORDER — TRAMADOL HYDROCHLORIDE 50 MG/1
1 TABLET ORAL
Qty: 42 | Refills: 0
Start: 2021-05-12 | End: 2021-05-18

## 2021-05-12 RX ORDER — TRAMADOL HYDROCHLORIDE 50 MG/1
100 TABLET ORAL EVERY 4 HOURS
Refills: 0 | Status: DISCONTINUED | OUTPATIENT
Start: 2021-05-12 | End: 2021-05-12

## 2021-05-12 RX ORDER — SENNA PLUS 8.6 MG/1
2 TABLET ORAL
Qty: 0 | Refills: 0 | DISCHARGE
Start: 2021-05-12

## 2021-05-12 RX ADMIN — Medication 2: at 11:27

## 2021-05-12 RX ADMIN — BENZOCAINE AND MENTHOL 1 LOZENGE: 5; 1 LIQUID ORAL at 07:56

## 2021-05-12 RX ADMIN — Medication 1 TABLET(S): at 11:26

## 2021-05-12 RX ADMIN — OXYCODONE HYDROCHLORIDE 10 MILLIGRAM(S): 5 TABLET ORAL at 06:06

## 2021-05-12 RX ADMIN — TRAMADOL HYDROCHLORIDE 100 MILLIGRAM(S): 50 TABLET ORAL at 15:21

## 2021-05-12 RX ADMIN — Medication 100 MILLIGRAM(S): at 02:43

## 2021-05-12 RX ADMIN — Medication 975 MILLIGRAM(S): at 06:04

## 2021-05-12 RX ADMIN — RIVAROXABAN 10 MILLIGRAM(S): KIT at 11:27

## 2021-05-12 RX ADMIN — Medication 4 MILLIGRAM(S): at 06:04

## 2021-05-12 RX ADMIN — Medication 1 MILLIGRAM(S): at 11:26

## 2021-05-12 RX ADMIN — Medication 975 MILLIGRAM(S): at 07:04

## 2021-05-12 RX ADMIN — Medication 975 MILLIGRAM(S): at 13:53

## 2021-05-12 RX ADMIN — OXYCODONE HYDROCHLORIDE 10 MILLIGRAM(S): 5 TABLET ORAL at 07:06

## 2021-05-12 RX ADMIN — SODIUM CHLORIDE 500 MILLILITER(S): 9 INJECTION INTRAMUSCULAR; INTRAVENOUS; SUBCUTANEOUS at 07:56

## 2021-05-12 RX ADMIN — Medication 325 MILLIGRAM(S): at 11:26

## 2021-05-12 RX ADMIN — METFORMIN HYDROCHLORIDE 500 MILLIGRAM(S): 850 TABLET ORAL at 06:05

## 2021-05-12 RX ADMIN — TRAMADOL HYDROCHLORIDE 100 MILLIGRAM(S): 50 TABLET ORAL at 11:20

## 2021-05-12 RX ADMIN — Medication 200 MILLIGRAM(S): at 11:27

## 2021-05-12 RX ADMIN — Medication 2: at 07:56

## 2021-05-12 RX ADMIN — TRAMADOL HYDROCHLORIDE 100 MILLIGRAM(S): 50 TABLET ORAL at 15:50

## 2021-05-12 RX ADMIN — TRAMADOL HYDROCHLORIDE 100 MILLIGRAM(S): 50 TABLET ORAL at 10:21

## 2021-05-12 RX ADMIN — PANTOPRAZOLE SODIUM 40 MILLIGRAM(S): 20 TABLET, DELAYED RELEASE ORAL at 07:56

## 2021-05-12 RX ADMIN — Medication 975 MILLIGRAM(S): at 15:00

## 2021-05-12 NOTE — DISCHARGE NOTE NURSING/CASE MANAGEMENT/SOCIAL WORK - NSDCFUADDAPPT_GEN_ALL_CORE_FT
Follow up with your surgeon in two weeks. Call for appointment.  If you need more pain medication, call your surgeon's office. For medication refills or authorizations, please call [phone]  We recommend that you call and schedule a follow up appointment within 2-4 weeks with your primary care physician for repeat blood work (CBC and BMP) for post hospital discharge follow-up care.  Call your surgeon if you have increased redness/pain/drainage or fever. Return to ER for shortness of breath/calf tenderness.

## 2021-05-12 NOTE — PROGRESS NOTE ADULT - SUBJECTIVE AND OBJECTIVE BOX
MARGA VÁZQUEZ is a 75y Female s/p RIGHT TOTAL KNEE ARTHROPLASTY        denies any chest pain shortness of breath palpitation dizziness lightheadedness nausea vomiting fever or chills    T(C): 36.4 (05-12-21 @ 05:30), Max: 36.8 (05-11-21 @ 08:59)  HR: 67 (05-12-21 @ 05:30) (52 - 70)  BP: 109/68 (05-12-21 @ 05:30) (82/46 - 134/72)  RR: 17 (05-12-21 @ 05:30) (15 - 22)  SpO2: 98% (05-12-21 @ 05:30) (95% - 100%)  no jvd/bruit  s1 s2 rrr  cta  s/nt/nd  no calf tend                        11.7   12.74 )-----------( 217      ( 12 May 2021 06:29 )             36.3   05-12    137  |  105  |  22  ----------------------------<  239<H>  4.3   |  23  |  1.20    Ca    8.2<L>      12 May 2021 06:29        cont dvt px  pain control  bowel regimen  antiemetics  incentive spirometer
Patient is 75y y/o Female s/p R TKA POD#0  Patient is seen and examined at bedside.   Pt tolerated procedure well without any intra-op complications.    Pain is controlled.  Denies CP/SOB/Dizziness/N/V/D/HA.     Vital Signs Last 24 Hrs  T(C): 36.3 (11 May 2021 19:10), Max: 36.8 (11 May 2021 08:59)  T(F): 97.4 (11 May 2021 19:10), Max: 98.3 (11 May 2021 08:59)  HR: 66 (11 May 2021 19:10) (52 - 66)  BP: 134/72 (11 May 2021 19:10) (82/46 - 134/72)  BP(mean): 63 (11 May 2021 15:51) (60 - 83)  RR: 18 (11 May 2021 19:10) (15 - 22)  SpO2: 95% (11 May 2021 19:10) (95% - 100%)      PHYSICAL EXAM:  General: A&Ox3 NAD  RLE: Dressing C/D/I with ACE wrap in place. Motor intact + EHL/FHL/TA/GS.  Sensation is grossly intact.  Extremity warm, compartments soft, compressible. No calf tenderness. DP 2+   LLE: Motor intact +EHL/FHL/TA/GS. Sensation is grossly intact. Extremity warm, compartments soft, compressible. No calf tenderness. DP2+    Labs:                          11.2   6.82  )-----------( 197      ( 11 May 2021 13:41 )             35.0               A/P: Patient is a 75y y/o Female s/p R TKA, POD #0  Bolus in PACU for hypotension   -wound care, knee extension/leg elevation, cryocuff, isometric exercises, new medications reviewed with pt  -Pain control/analgesia  -Inc spirometry reviewed with pt, demonstrated competence  -DVT prophylaxis with Venodynes/ Xarelto  -F/U AM Labs  -PT/OT/WBAT  -prophylactic Antibiotic  -medical consult  -DC planning    
Patient is 75y y/o Female s/p R TKA POD#1  Patient is seen and examined at bedside.   Pt tolerated procedure well without any intra-op complications.    Pain is controlled.  Denies CP/SOB/Dizziness/N/V/D/HA.     Vital Signs Last 24 Hrs  T(C): 36.3 (12 May 2021 09:30), Max: 36.8 (11 May 2021 12:53)  T(F): 97.4 (12 May 2021 09:30), Max: 98.3 (11 May 2021 17:47)  HR: 63 (12 May 2021 10:10) (52 - 72)  BP: 120/79 (12 May 2021 10:10) (82/46 - 134/72)  BP(mean): 63 (11 May 2021 15:51) (60 - 83)  RR: 16 (12 May 2021 10:10) (15 - 22)  SpO2: 99% (12 May 2021 10:10) (95% - 100%)      PHYSICAL EXAM:  General: A&Ox3 NAD  RLE: Prineo Dressing C/D/I with ACE wrap in place. Motor intact + EHL/FHL/TA/GS.  Sensation is grossly intact.  Extremity warm, compartments soft, compressible. No calf tenderness. DP 2+   LLE: Motor intact +EHL/FHL/TA/GS. Sensation is grossly intact. Extremity warm, compartments soft, compressible. No calf tenderness. DP2+    Labs:                          11.7   12.74 )-----------( 217      ( 12 May 2021 06:29 )             36.3       05-12    137  |  105  |  22  ----------------------------<  239<H>  4.3   |  23  |  1.20    Ca    8.2<L>      12 May 2021 06:29        A/P: Patient is a 75y y/o Female s/p R TKA, POD # 1  Hypotension- Bolus this AM  -wound care, knee extension/leg elevation, cryocuff, isometric exercises, new medications reviewed with pt  -Pain control/analgesia: Tramadol  -Inc spirometry reviewed with pt, demonstrated competence  -DVT prophylaxis with Venodynes/Xarelto  -F/U AM Labs  -PT/OT/WBAT  -prophylactic Antibiotic  -medical consult  -DC planning: home today

## 2021-05-13 LAB — SURGICAL PATHOLOGY STUDY: SIGNIFICANT CHANGE UP

## 2021-05-19 DIAGNOSIS — K21.9 GASTRO-ESOPHAGEAL REFLUX DISEASE WITHOUT ESOPHAGITIS: ICD-10-CM

## 2021-05-19 DIAGNOSIS — M17.11 UNILATERAL PRIMARY OSTEOARTHRITIS, RIGHT KNEE: ICD-10-CM

## 2021-05-19 DIAGNOSIS — E66.9 OBESITY, UNSPECIFIED: ICD-10-CM

## 2021-05-19 DIAGNOSIS — I95.81 POSTPROCEDURAL HYPOTENSION: ICD-10-CM

## 2021-05-19 DIAGNOSIS — I48.91 UNSPECIFIED ATRIAL FIBRILLATION: ICD-10-CM

## 2021-05-19 DIAGNOSIS — Z79.01 LONG TERM (CURRENT) USE OF ANTICOAGULANTS: ICD-10-CM

## 2021-05-19 DIAGNOSIS — I10 ESSENTIAL (PRIMARY) HYPERTENSION: ICD-10-CM

## 2021-05-19 DIAGNOSIS — E11.9 TYPE 2 DIABETES MELLITUS WITHOUT COMPLICATIONS: ICD-10-CM
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
## 2021-05-19 DIAGNOSIS — E78.5 HYPERLIPIDEMIA, UNSPECIFIED: ICD-10-CM

## 2021-05-19 DIAGNOSIS — Z87.891 PERSONAL HISTORY OF NICOTINE DEPENDENCE: ICD-10-CM

## 2021-05-19 DIAGNOSIS — Z79.4 LONG TERM (CURRENT) USE OF INSULIN: ICD-10-CM

## 2021-05-19 DIAGNOSIS — Z88.0 ALLERGY STATUS TO PENICILLIN: ICD-10-CM

## 2021-05-19 DIAGNOSIS — E03.9 HYPOTHYROIDISM, UNSPECIFIED: ICD-10-CM

## 2021-08-18 ENCOUNTER — APPOINTMENT (OUTPATIENT)
Dept: PULMONOLOGY | Facility: CLINIC | Age: 76
End: 2021-08-18
Payer: MEDICARE

## 2021-08-18 VITALS
DIASTOLIC BLOOD PRESSURE: 58 MMHG | TEMPERATURE: 96.8 F | RESPIRATION RATE: 14 BRPM | BODY MASS INDEX: 26.95 KG/M2 | HEIGHT: 63.5 IN | HEART RATE: 62 BPM | SYSTOLIC BLOOD PRESSURE: 121 MMHG | WEIGHT: 154 LBS | OXYGEN SATURATION: 96 %

## 2021-08-18 PROBLEM — Z87.39 PERSONAL HISTORY OF OTHER DISEASES OF THE MUSCULOSKELETAL SYSTEM AND CONNECTIVE TISSUE: Chronic | Status: ACTIVE | Noted: 2021-04-27

## 2021-08-18 PROBLEM — I48.91 UNSPECIFIED ATRIAL FIBRILLATION: Chronic | Status: ACTIVE | Noted: 2021-04-27

## 2021-08-18 PROBLEM — E78.5 HYPERLIPIDEMIA, UNSPECIFIED: Chronic | Status: ACTIVE | Noted: 2021-04-27

## 2021-08-18 PROBLEM — E11.9 TYPE 2 DIABETES MELLITUS WITHOUT COMPLICATIONS: Chronic | Status: ACTIVE | Noted: 2021-04-27

## 2021-08-18 PROBLEM — E07.9 DISORDER OF THYROID, UNSPECIFIED: Chronic | Status: ACTIVE | Noted: 2021-04-27

## 2021-08-18 PROBLEM — I10 ESSENTIAL (PRIMARY) HYPERTENSION: Chronic | Status: ACTIVE | Noted: 2021-04-27

## 2021-08-18 PROCEDURE — 99214 OFFICE O/P EST MOD 30 MIN: CPT

## 2021-08-18 NOTE — DISCUSSION/SUMMARY
[FreeTextEntry1] : \par ____________\par PATIENT DATA\par ___________________\par \par AGE.       75 (2021)                \par SEX.          f \par ALLERGY. 2020 pncl Has taken zpak in past without problem \par SMOKING HISTORY. \par 2020 Quit smoking  1 ppd No ETOH abuse No drug abuse \par OCCUPATION. \par PETS. \par FAM HO CANCER. \par Uterine Mother  at age 67  \par Esophagus Brother  at age 51 \par FAMILY HO VTE. \par FAM HO ASTHMA. \par \par BIRTHPLACE.  \par ASBESTOS EXPOSURE.\par \par ASTHMA CONTROL.   \par SYMBICORT USE 2021 1 p 80 4.5 avge 1/wk\par NOCT awakening 2021 never \par ACTIVITY 2021 No limitation Vacuuming auses some sob    \par \par COPD MEDS\par Symbicort (2020) --> Symbicort 80 (2020) \par \par DYSPNEA SCORE. \par 2020               1 I get short of breath when hurrying on level ground or walking up a slight hill \par 2020 [facility] 2 Cannot keep up with others because of breathing and knee prob\par PEAK FLOW. \par PULSE OXIMETRY. 2021 ra rest 96%\par 2021 ra rest 96%\par 3/20/2019 RA 97% 61\par 2018 po 99% 67\par HOME OXYGEN.  \par \par BP. 2021 120/50\par WT. 2021 154\par 2021 164 lb\par 2020 165 lb     \par 2020 166                        \par BMI.  2021 26 \par 2021 28\par 2020 29 \par 2020 29\par EDS. \par STOP BANG SCORE.  \par

## 2021-08-18 NOTE — ASSESSMENT
[FreeTextEntry1] : \par _______________\par VANALEJANDRO MARGA ASSESSMENT RECOMMENDATIONS \par _________________\par ___________\par FOLLOWUP PLAN\par __________________\par \par 2021 RTC 3 m \par \par ____________\par LUNG NODULES\par ______________\par SMOKING Former 1 ppd smoker quit \par FAM Fam HO Ca Uterine mother  age 67 Esoph father and brother  age 51\par 2021 CT ch stable mod emphysema sm bl nodules stable since 2018 no new lesions \par 2021 Results if ct dw t and will plan ct ch in 2022\par ____\par COPD ASTHMA\par ____________\par ELEVATED IGE 9/10/2020  \par SPIROMETRY.2020 No obstr Mild retsrictive pattern FEF 25 75 decreased suggest small airway obstrn\par SPIROMETRY 2018 FVC 1.33 (62%) FEV1 1.07 (64%) FEV1% 80% \par 2020  69% FEV1 127 71% FEV1/FVC 71% FEF 25 75 37% TLC 64%dlco 81% pfts cw asthma or chr bronchitis Mild restrn possibly sec to chf\par 2021 breathing worse during pollen season \par \par 2021 Asthma is under good control \par 2021 Cont symbicort prn\par ___________\par SLEEP APNEA\par ___________\par 2021 Is not using cpap PSG 2020 was negv\par ______________\par POSTNASAL DRIP \par _____________\par SUBJECTIVE \par 2018 Postnasal drip has not improved \par 2021 Post nasal drip worse since she stopped using cpap \par 2021 Continues to have postnasal drip \par \par ASSESSMENT/RECOMMENDATIONS  \par 2021 Is using flonase but is not using daily will start using daily and will try 1 p each nostril bid \par \par \par

## 2021-08-18 NOTE — PHYSICAL EXAM
[No Acute Distress] : no acute distress [Normal Oropharynx] : normal oropharynx [Normal Appearance] : normal appearance [No Neck Mass] : no neck mass [Normal Rate/Rhythm] : normal rate/rhythm [Normal S1, S2] : normal s1, s2 [No Murmurs] : no murmurs [No Resp Distress] : no resp distress [No Abnormalities] : no abnormalities [Clear to Auscultation Bilaterally] : clear to auscultation bilaterally [Benign] : benign [No Clubbing] : no clubbing [Normal Gait] : normal gait [No Cyanosis] : no cyanosis [No Edema] : no edema [FROM] : FROM [Normal Color/ Pigmentation] : normal color/ pigmentation [No Focal Deficits] : no focal deficits [Oriented x3] : oriented x3 [Normal Affect] : normal affect

## 2021-08-18 NOTE — CONSULT LETTER
[Dear  ___] : Dear  [unfilled], [Consult Letter:] : I had the pleasure of evaluating your patient, [unfilled]. [Please see my note below.] : Please see my note below. [Sincerely,] : Sincerely, [FreeTextEntry3] : Yours truly,\par \par Gordon Marquez MD

## 2021-09-03 ENCOUNTER — RX RENEWAL (OUTPATIENT)
Age: 76
End: 2021-09-03

## 2021-11-17 ENCOUNTER — APPOINTMENT (OUTPATIENT)
Dept: PULMONOLOGY | Facility: CLINIC | Age: 76
End: 2021-11-17

## 2021-12-01 ENCOUNTER — RX RENEWAL (OUTPATIENT)
Age: 76
End: 2021-12-01

## 2021-12-30 ENCOUNTER — TRANSCRIPTION ENCOUNTER (OUTPATIENT)
Age: 76
End: 2021-12-30

## 2022-01-11 ENCOUNTER — APPOINTMENT (OUTPATIENT)
Dept: THORACIC SURGERY | Facility: CLINIC | Age: 77
End: 2022-01-11
Payer: MEDICARE

## 2022-01-11 PROCEDURE — 99443: CPT

## 2022-01-11 NOTE — HISTORY OF PRESENT ILLNESS
[FreeTextEntry1] : 76 year old female former smoker with history of A-fib (on Xarelto, s/p ablation 5/2017) hyperlipidemia, and hyperthyroidism, GERD, Diabetes and left sided implanted Cardiac Loop recorder. She is referred by Dr. Gordon Marquez. As per his note: the patient had a CXR in June 2018 which revealed an anterior mediastinal mass. CT Scan of the Chest revealed bilateral pulmonary nodules. \par  \par CT Chest on 5/29/19:\par - stable 5mm RLL\par - stable 3mm LLL\par - emphysematous lung changes\par \par CT Chest on 11/7/19:\par - stable 5 mm nodule RLL\par - 3-4 mm nodule, stable \par \par PFTs on 9/18/2020: FVC 69%, FEV1 71%, DLCO 81%.\par \par CT Chest on 1/6/21:\par - stable moderate emphysema\par - stable, small lung nodules bilaterally since 12/2018: 5 x 5mm RLL (7:106); 4 x 3mm LLL (7:153); 3mm RML (7:164); 3mm RLL (7:184); 5 x 3mm RLL (7:204); 5 x 5mm RLL (7:228)\par - no new lung lesions\par \par CT Chest on 1/5/22:\par - Stable, bilateral pulmonary nodules dating back to 12/13/2018. No new nodules are noted \par \par Patient is here today for a follow up via telephonic visit. Patient denies worsening SOB, chest pain, cough, hemoptysis, fever, chills, night sweats, lightheadedness or dizziness.\par

## 2022-01-11 NOTE — REASON FOR VISIT
[Follow-Up: _____] : a [unfilled] follow-up visit [Medical Office: ([facility])___] : at the medical office located in  [Verbal consent obtained from patient] : the patient, [unfilled]

## 2022-01-11 NOTE — CONSULT LETTER
[Consult Letter:] : I had the pleasure of evaluating your patient, [unfilled]. [( Thank you for referring [unfilled] for consultation for _____ )] : Thank you for referring [unfilled] for consultation for [unfilled] [Please see my note below.] : Please see my note below. [Consult Closing:] : Thank you very much for allowing me to participate in the care of this patient.  If you have any questions, please do not hesitate to contact me. [Sincerely,] : Sincerely, [FreeTextEntry2] : Gordon Marquez MD (Pulm) [FreeTextEntry3] : Horacio Garrett MD\par Director of Thoracic, [facility]\par Cardiovascular & Thoracic Surgery\par \par [facility]\par [address]\par Oncology Building 3rd Fl\[place] [address]\par Tel: [phone]\par Fax: [phone]\par

## 2022-01-11 NOTE — ASSESSMENT
[FreeTextEntry1] : 76 year old female former smoker with history of A-fib (on Xarelto, s/p ablation 5/2017) hyperlipidemia, and hyperthyroidism, GERD, Diabetes and left sided implanted Cardiac Loop recorder. \par  \par I have independently reviewed the medical records and imaging at the time of this office consultation, and discussed the following interpretations and recommendations with the patient:\par - CT scan with stable findings. Recommendation for patient to return to clinic in 1 year with repeat CT chest, no contrast, to re-evaluate stability. \par \par Recommendations reviewed with patient during this office visit, and all questions answered; Patient instructed on the importance of follow up and verbalizes understanding.\par \par I personally performed the services described in the documentation, reviewed the documentation recorded by the scribe in my presence and it accurately and completely records my words and actions.\par \par AKIRA, KIANNA Lim-C, am scribing for and the presence of JEFFREY Santos, the following sections HISTORY OF PRESENT ILLNESS, PAST MEDICAL/FAMILY/SOCIAL HISTORY; REVIEW OF SYSTEMS; VITAL SIGNS; PHYSICAL EXAM; DISPOSITION.\par \par

## 2022-02-02 ENCOUNTER — RX RENEWAL (OUTPATIENT)
Age: 77
End: 2022-02-02

## 2022-02-02 ENCOUNTER — APPOINTMENT (OUTPATIENT)
Dept: PULMONOLOGY | Facility: CLINIC | Age: 77
End: 2022-02-02
Payer: MEDICARE

## 2022-02-02 VITALS
WEIGHT: 157 LBS | HEIGHT: 63.5 IN | HEART RATE: 77 BPM | TEMPERATURE: 98 F | DIASTOLIC BLOOD PRESSURE: 78 MMHG | BODY MASS INDEX: 27.47 KG/M2 | SYSTOLIC BLOOD PRESSURE: 135 MMHG | RESPIRATION RATE: 14 BRPM | OXYGEN SATURATION: 96 %

## 2022-02-02 PROCEDURE — 99214 OFFICE O/P EST MOD 30 MIN: CPT

## 2022-02-02 RX ORDER — BUDESONIDE AND FORMOTEROL FUMARATE DIHYDRATE 80; 4.5 UG/1; UG/1
80-4.5 AEROSOL RESPIRATORY (INHALATION)
Qty: 20.4 | Refills: 0 | Status: ACTIVE | COMMUNITY
Start: 2021-01-24 | End: 1900-01-01

## 2022-02-02 NOTE — CONSULT LETTER
[Dear  ___] : Dear  [unfilled], [Consult Letter:] : I had the pleasure of evaluating your patient, [unfilled]. [Please see my note below.] : Please see my note below. [Consult Closing:] : Thank you very much for allowing me to participate in the care of this patient.  If you have any questions, please do not hesitate to contact me. [FreeTextEntry3] : Yours truly,\par \par Gordon Marquez MD

## 2022-02-02 NOTE — ASSESSMENT
[FreeTextEntry1] : ___________________\par BEST PRACTICE ISSUES\par _____________________\par Patient was given general advice  to call office and/or  go to ER right away for new or worsening  symptoms\par ________________________________\par NEED FOR HOME OXYGEN/HOME NPPV. \par __________________________ .\par Does not need home O2 \par 2022 RA rest po 96%\par _____________________________\par IMMUNIZATION.\par ___________________ \par COMMENTS.\par utd with covid pneum and flu \par INFLUENZA VACCINE.\par 10/2021 \par 10/23/2020 Flu\par PNEUMONIA VACCINE. \par Pneumonia shot  \par COVID VACCINE.\par 2022 Took booster moderna 2021 \par COVID moderan 2021 completed both \par _______________\par SMOKING.\par _________\par ASSESSMENT RECOMMENDATIONS.\par Not smoking \par \par 2020 Quit smoking  1 ppd \par Started age 17 quit at age 56\par No ETOH abuse No drug abuse \par \par ______\par OBESITY.\par __________\par ASSESSMENT RECOMMENDATIONS. \par Not obese \par \par WT. \par 2022 157\par 2021 154\par 2021 164 lb\par 2020 165 lb     \par 2020 166                        \par BMI.  \par 2022 27\par 2021 26 \par 2021 28\par 2020 29 \par 2020 29\par \par __________\par SLEEP APNEA.\par _____________\par 2022 Sleep apnea was excluded \par  Sleep apnea ruled out on psg done 2020 Pt now not using cpap\par ___\par DYSPNEA. \par MMRC SCORE.\par 2022 MMRC 3 gets sob carrying groceries and cannot keep up with peers nut is mainly from pain knee \par 2020               1 I get short of breath when hurrying on level ground or walking up a slight hill \par 2020 MMRC 2 Cannot keep up with others because of breathing and knee prob\par \par \par ___\par PFTS. \par 2020  69% FEV1 127 71% FEV1/FVC 71% FEF 25 75 37% TLC 64%dlco 81% pfts cw asthma or chr bronchitis Mild restrn possibly sec to chf\par SPIROMETRY.2020 No obstr Mild retsrictive pattern FEF 25 75 decreased suggest small airway obstrn\par SPIROMETRY 2018 FVC 1.33 (62%) FEV1 1.07 (64%) FEV1% 80% \par \par _______________________\par INHALER REGIME ASSESSMENT. \par 2022 iS USING SYMBICORT 80 1P PRN WITH SPACER AND RINSES WATER AFTER USE\par __________________\par SPACER USE ASSESSMENT.\par 2022 Is suing spacer\par ____________\par LUNG NODULES\par _______________\par \par HISTORY 2018 Subcm nodules found\par CXR 2018 CXr showed possible mediuastinal mass and patient was referred for CT ch which did not show mediastinal mass but showed subcm lung nodules and one nodule had increased in size \par CTS 2018 She was sent to see Dr SAIRA Garrett who she saw on 2018\par SMOKING Former 1 ppd smoker quit \par FAM Fam HO Ca Uterine mother  age 67 Esoph father and brother  age 51\par \par \par     \par LUNG NODULES\par 2022 CT Ch [facility] 222  cw dating back to 2018 \par IMP Stable bl nodules No new nodules \par DETAILS cw multiple prior studies latest 2021 \par COPD\par Mild apical scarring\par Scattered small circumscribed nodules bl\par 5x5 mm rll sup segment\par 4 mm ant lll \par 3 mm rml\par 3 mm rll \par 5x3 mm ant rll \par 5x5 mm post rll \par Nodules all nsc since 2018 \par \par 2021 CT ch cw 2018 stable mod emphysema sm bl nodules atable no new lesions \par 2019 CT ch [facility] 222 0222 \par stable scattered multiple small 6 mm largest nodules cw 2019 and  \par Severel increased cw 2012 ct recommended in 1 y \par \par \par ASSESSMENT RECOMMENDATIONS. \par 2022 Results dw pt\par 2022 Pt also saw Dr SAIRA Garrett cts recently\par 2022 Will plan repeat CT in 1 y \par \par ____\par COPD ASTHMA\par ____________\par ELEVATED IGE 9/10/2020  \par SPIROMETRY.2020 No obstr Mild retsrictive pattern FEF 25 75 decreased suggest small airway obstrn\par SPIROMETRY 2018 FVC 1.33 (62%) FEV1 1.07 (64%) FEV1% 80% \par 2020  69% FEV1 127 71% FEV1/FVC 71% FEF 25 75 37% TLC 64%dlco 81% pfts cw asthma or chr bronchitis Mild restrn possibly sec to chf\par 2021 breathing worse during pollen season \par \par ASSESSMENT RECOMMENDATION\par 2022 aSTHMA IS UNDER GOOD CONTROL sHE IS REQUIREING SYMBICORT 80 1-2 P AVERGE 2 D A WEEK AND THERE ARE NO NOCTURNAL AWAKENINGS \par Cont Rx\par 2022 RTC 4 m\par \par \par

## 2022-02-02 NOTE — REASON FOR VISIT
[Follow-Up] : a follow-up visit [Asthma] : asthma [Pulmonary Nodules] : pulmonary nodules [TextBox_13] : Dr. NICOL NUGENT

## 2022-02-02 NOTE — DISCUSSION/SUMMARY
[FreeTextEntry1] : \par \par \par ___________\par PATIENT SUMMARY\par ______________\par \par CONTACT. [phone] \par PCP. DR NICOL NUGENT\par REFERRING MD. DR NICOL NUGENT\par INITIAL VISIT DATE .\par ALLERGY. \par  2020 pncl Has taken zpak in past without problem \par HISTORY. \par INITIAL VISIT 2020\par 74 f PMH A fin hypertrophic cardiomyopathy diagnosed with ALICIA 10 y ago and was referred to me for lung nodules sleep apnea and COPD \par She is on xarelto for a fib \par In 2020 she was referred to see sleep specialis Dr Lon Miller \par 2020 She is going for home sleep test \par ___________\par PATIENT DATA\par ______________ \par HABITS.   \par 2020 Quit smoking  1 ppd \par Started age 17 quit at age 56\par No ETOH abuse No drug abuse \par OCCUPN.     child support\par PETS.   None\par FAM.  \par FAM HO CANCER. \par Uterine Mother  at age 67  \par Esophagus Brother  at age 51 \par FAMILY HO VTE.  No \par FAM HO ASTHMA.  No\par BIRTHPLACE.                              \par ASBESTOS EXPOSURE.\par TB EXPOSURE. \par ___________\par PROBLEM LIST\par ______________\par \par PULMONARY PROBLEMS\par     \par LUNG NODULES\par 2022 CT stable subcm nodules Is seeing Dr SAIRA Garrett also\par \par COPD/ASTHMA \par 2022 Is doing well on symbicort 80 1p prn \par 2022 Used it twice a week Has spacer and rinses mouth water after use  \par \par \par POSTNASAL DRIP \par 2022 Flonase causes epistaxis so has stopped using iit Is now using saline spray\par \par SLEEP APNEA\par 2022 Sleep apnea ruled out 2020 psg and is no longer using cpap  \par \par INTERCURRENT PROBLEMS\par PREOP PULMONARY KNEE  REPLACEDMENT done  2021 \par 2021 is going for knee replacement surgery Dr Mara HAN VS date   \par \par NON-PULMONARY PROBLEMS  \par \par A fib\par  (on xarelto, s/p ablation in 2017), \par Cardiology, Dr. Chauncey Pierre, [facility] in [place] center for Afib. \par \par PALPITATIONS\par Has Loop recorder for palpitations.\par \par HLD,  \par \par TKR r knee 2021 Dr Araujo \par \par Hyperthyroidism, \par TSH (0.27-4.2) 9/10/2020 TSH .04\par 9/10/2020 TSH was .04 (.27-4.2 Advised to dw her PMD and see endocrine \par \par DM \par Endocrinologist  Dr. Delatorre  hba1c was around 8 in Dec 2017. \par \par GERD\par Gastroenterologist  Dr. Alberts for .\par Ophtho  Dr. Sandro Porter \par GYN  Dr. Liudmila Hess in [place].\par \par SPINAL STENOSIS \par 2020 gets epidural injectionsOrkin Matthews\par \par ___________________\par PERTINENT ROS/PE\par ________________\par Entries in this section were  updated today and override conflicting entries \par \par DYSPNEA.    2022 SOB at times when she climbs steps or when holding groceries \par LEG SWELLING.   .2022 No \par COUGH. 2022 No \par PHLEGM. 2022 Occl \par FEVER. 2022 No \par WEIGHT LOSS. 2022 No \par \par JVD.2022  No\par PEDAL EDEMA. 2022 No \par WHEEZE. 2022 No \par CLUBBING.      2022 No                     \par \par

## 2022-02-27 ENCOUNTER — RX RENEWAL (OUTPATIENT)
Age: 77
End: 2022-02-27

## 2022-02-28 RX ORDER — BUDESONIDE AND FORMOTEROL FUMARATE DIHYDRATE 160; 4.5 UG/1; UG/1
160-4.5 AEROSOL RESPIRATORY (INHALATION)
Qty: 10.2 | Refills: 0 | Status: ACTIVE | COMMUNITY
Start: 2020-12-07 | End: 1900-01-01

## 2022-05-03 ENCOUNTER — APPOINTMENT (OUTPATIENT)
Dept: ORTHOPEDIC SURGERY | Facility: CLINIC | Age: 77
End: 2022-05-03
Payer: MEDICARE

## 2022-05-03 VITALS — WEIGHT: 157 LBS | BODY MASS INDEX: 27.47 KG/M2 | HEIGHT: 63.5 IN

## 2022-05-03 DIAGNOSIS — Z96.651 PRESENCE OF RIGHT ARTIFICIAL KNEE JOINT: ICD-10-CM

## 2022-05-03 PROCEDURE — 73562 X-RAY EXAM OF KNEE 3: CPT | Mod: RT

## 2022-05-03 PROCEDURE — 99213 OFFICE O/P EST LOW 20 MIN: CPT

## 2022-05-03 NOTE — HISTORY OF PRESENT ILLNESS
[2] : 2 [1] : 2 [Burning] : burning [] : yes [de-identified] : 5/2/22: 1 year s/p R TKA doing well. She has transitioned from PT to HEP. \par \par Previous doc:\par 9/7/21: Ms. Fatimah Troncoso, a 75-year-old female, s/p 3.5 months R TKA. NC from Dr. Arrington. feeling better, sleeping better, using Tylenol as preference, pain meds for significant pain. Primary complaint is preforming exercises due to ongoing sciatica right side. PT reports last epidural injections preformed prior to her knee replacement. \par 10/26/21: 5 months s/p R TKA. Her R knee is doing well. The right-sided lumbar radicular symptoms is of most concern to her. PT and HEP are temporarily beneficial. LESI from 04/2021 by Dr. Sunshine had been very beneficial.  [FreeTextEntry1] : right knee [FreeTextEntry7] : behind knee [de-identified] : pt

## 2022-05-03 NOTE — ASSESSMENT
[FreeTextEntry1] : 9/7/21: ~ 4 months s/p Right Knee TKA Dr Gomez. Improving again, Improved sleeping, reviewed xrays from 6/30/21, appear well fixed. Primary complaints is difficulty preforming exercise due to ongoing sciatica to the right leg. Discussed post op protocol and recommendations, to resume PT. - overall looks good and TKA is progressing appropriate has some pain light touch at knee doing better with Neurontin - will cont PT for L spine and sciatic like symptoms Knee si doing well\par 10/26/21: R knee is doing well. f/up for r knee in 6 months. Continue with PT for lumbar spine. Advised to f/up with Dr. Sunshine to discuss repeat LESI. \par Informed her of prophylactic antibiotics prior to dental appointments.\par \par 5/3/22: 1 year s/p R TKA doing well with no pain. She is overall happy and will follow up in 2-3 years. Does have anterior nerve tenderness and is on Gabapentin for that - overall very happy - using cane for her back issues

## 2022-05-03 NOTE — IMAGING
[AP] : anteroposterior [Lateral] : lateral [[place]] : skyline [Components well fixed, in good position] : Components well fixed, in good position [de-identified] : Right Knee: Inspection of the knee is as follows: no swelling Knee Range of Motion is as follows: Extension: 3 degrees. Flexion: 120 degrees. Gait and function is as follows: ambulation with cane. Ligament stable lateral numbness

## 2022-05-24 ENCOUNTER — APPOINTMENT (OUTPATIENT)
Dept: PAIN MANAGEMENT | Facility: CLINIC | Age: 77
End: 2022-05-24
Payer: MEDICARE

## 2022-05-24 PROCEDURE — 82948 REAGENT STRIP/BLOOD GLUCOSE: CPT

## 2022-05-24 PROCEDURE — 62323 NJX INTERLAMINAR LMBR/SAC: CPT

## 2022-05-24 NOTE — PROCEDURE
[FreeTextEntry3] : Date of Service: 05/24/2022 \par \par Account: [id number]\par \par Patient: MARGA VÁZQUEZ \par \par YOB: 1945\par \par Age: 76 year\par \par \par Surgeon:                                                         Josh Sunshine D.O.\par \par Pre-Operative Diagnosis:                             Lumbosacral radiculitis (M54.17)\par \par Post-Operative Diagnosis:                           Lumbosacral Radiculitis (M54.17)\par \par Procedure:                                                      Interlaminar lumbar epidural steroid injection (L2-3) under fluoroscopic guidance\par \par Anesthesia:                                                     Local with MAC\par \par \par This procedure was carried out using fluoroscopic guidance.  The risks and benefits of the procedure were discussed extensively with the patient.  The consent of the patient was obtained and the following procedure was performed.\par \par The patient was placed in the prone position.  The lumbar area was prepped and draped in a sterile fashion.  A timeout was performed with all essential staff present and the site and side were verified.  Under AP view with slight cephalad-caudad angulation, the L2-3 interspace was identified and marked.  Using sterile technique, the superficial skin was anesthetized with 1% Lidocaine without epinephrine.  A 20-gauge Tuohy needle was advanced into the epidural space under fluoroscopy using [id number] technique and using loss of resistance at the L2-3 level.  After negative aspiration for heme or CSF, an epidurogram was obtained using 2-3 cc Omnipaque contrast injected under live fluoroscopy, confirming epidural placement of the needle.  Epidurogram showed no evidence of intrathecal or intravascular flow, and good evidence of bilateral epidural flow. \par \par After this, 4 cc of preservative free normal saline plus 12 mg of betamethasone were injected into the epidural space.\par \par The needle was subsequently removed.  Anesthesia personnel were present throughout the procedure.\par \par The patient tolerated the procedure well and was instructed to contact me immediately if there were any problems.\par \par Josh Sunshine D.O.\par

## 2022-06-01 ENCOUNTER — APPOINTMENT (OUTPATIENT)
Dept: PULMONOLOGY | Facility: CLINIC | Age: 77
End: 2022-06-01
Payer: MEDICARE

## 2022-06-01 VITALS
DIASTOLIC BLOOD PRESSURE: 69 MMHG | WEIGHT: 144 LBS | RESPIRATION RATE: 14 BRPM | SYSTOLIC BLOOD PRESSURE: 133 MMHG | HEART RATE: 72 BPM | HEIGHT: 63.5 IN | OXYGEN SATURATION: 98 % | TEMPERATURE: 98 F | BODY MASS INDEX: 25.2 KG/M2

## 2022-06-01 PROCEDURE — 99214 OFFICE O/P EST MOD 30 MIN: CPT

## 2022-06-01 NOTE — ASSESSMENT
[FreeTextEntry1] : \par ___________\par PATIENT PRESENTATION\par ______________\par \par CONTACT. [phone] \par PCP. DR NICOL NUGENT\par REFERRING MD. DR NICOL NUGENT\par INITIAL VISIT DATE .\par ALLERGY. \par  2020 pncl \par Has taken zpak in past without problem )\par HISTORY. \par INITIAL VISIT 2020\par 74 f PMH A fin hypertrophic cardiomyopathy diagnosed with ALICIA 10 y ago and was referred to me for lung nodules sleep apnea and COPD \par She is on xarelto for a fib \par In 2020 she was referred to see sleep specialis Dr Lon Miller \par ___________\par PATIENT DATA\par ______________ \par HABITS.   \par 2020 \par Started age 17 quit at age 56\par Avge 1 ppd \par Quit smoking  \par 39 pyh\par No ETOH abuse No drug abuse \par OCCUPN.     child support\par PETS.   None  \par FAM HO CANCER. \par Uterine Mother  at age 67  \par Esophagus Brother  at age 51 \par FAMILY HO VTE.  No \par FAM HO ASTHMA.  No\par ___________\par PROBLEM LIST\par ______________\par \par PULMONARY PROBLEMS\par     \par LUNG NODULES\par 2022 CT stable subcm nodules Is seeing Dr SAIRA Garrett also\par \par COPD/ASTHMA \par 2022 Is doing well on symbicort 80 1p prn \par 2022 Used it twice a week Has spacer and rinses mouth water after use  \par \par \par POSTNASAL DRIP \par 2022 Flonase causes epistaxis so has stopped using iit Is now using saline spray\par \par SLEEP APNEA\par 2022 Sleep apnea ruled out 2020 psg and is no longer using cpap  \par 2022 Pt now follows with Dr Trevino who repeated sleep study 3/2022 and told her she does have sleep apena and she is awaiting cpap \par \par INTERCURRENT PROBLEMS\par PREOP PULMONARY KNEE  REPLACEDMENT done  2021 \par 2021 is going for knee replacement surgery Dr Mara HAN VS date   \par \par NON-PULMONARY PROBLEMS  \par \par A fib\par  (on xarelto, s/p ablation in 2017), \par Cardiology, Dr. Chauncey Pierre, [facility] in [place] center for Afib. \par \par PALPITATIONS\par Has Loop recorder for palpitations.\par \par HLD,  \par \par PREOP PULMONARY KNEE  REPLACEDMENT SCHEDULED 2021 \par \par TKR r knee 2021 Dr Araujo \par ____________\par LUNG NODULES\par _______________\par \par HISTORY \par 2018 CXr showed possible mediuastinal mass and patient was referred for CT ch which did not show mediastinal mass but showed subcm lung nodules and one nodule had increased in size \par \par SMOKING \par Started age 17 quit at age 56\par Avge 1 ppd \par Quit smoking  \par 40 pyh\par FAM \par Fam HO Ca Uterine mother  age 67\par Esoph father and brother  age 51\par CTS \par 2018  Seen by Dr SAIRA Garrett 2018\par     \par LUNG NODULES\par 2022 CT Ch [facility]  cw dating back to 2018 \par DETAILS cw multiple prior studies latest 2021 \par COPD\par Mild apical scarring\par Scattered small circumscribed nodules bl\par 5x5 mm rll sup segment\par 4 mm ant lll \par 3 mm rml\par 3 mm rll \par 5x3 mm ant rll \par 5x5 mm post rll \par Nodules all nsc since 2018 \par IMP Stable bl nodules No new nodules  \par \par ASSESSMENT RECOMMENDATIONS. \par 2022 Pln CT chest 2023\par __________\par ASTHMA.\par ___________\par HISTORY.\par Intermittent asthma  relieved by symbicort\par \par AEC. 2020 aec 210\par IGE. 2022 ige 348\par \par P BNP. 2022 bnp 346\par \par IMAGING. \par Ct Ch 2022 [facility] subcm nodules all nsc 2018 Mod centrilobular and paraseptal emphysema rosetta upper lobes \par PFTS. \par PFTS 2020 \par  69% FEV1 127 71% FEV1/FVC 71% FEF 25 75 37% \par TLC 64% RV 56% RV/TLC 88% \par DLCO 81% \par IMP Mild restrictn Preserved diffusn Small airway obstrn \par ___\par DYSPNEA. \par MMRC SCORE.\par 2022 MMRC 3 Shoirtness of breath is eexertional Gets short of breath climbing up stairs \par \par ASSESSMENT.\par \par IMPAIRMENT. \par 2020 FEV1 71%\par BIOMARKERS.\par ELEVATED IGE 9/10/2020 \par TRIGGERS. \par 2021 breathing worse during pollen season \par SYMPTOM CONTROL.  \par 2022 Asthma is under good control \par 2022 aSTHMA IS UNDER GOOD CONTROL sHE IS REQUIREING SYMBICORT 80 1-2 P AVERGE 2 D A WEEK AND THERE ARE NO NOCTURNAL AWAKENINGS     \par PREVIOUS  EXACERBATIONS.\par 2022 No exacerbations in prev year\par \par RECOMMENDATIONS.\par 2022 Conbtinue symbicort with spacer and rinse mouth\par \par CHECKLIST.  \par ADHERANCE. \par 2022 Checked Uses as receommended\par ________________________________\par NEED FOR HOME OXYGEN. \par __________________________ .\par ASSESSMENT RECOMMENDATIONS.\par Does not need home O2 \par \par PATIENT DATA.\par 2022 ra rest 98%\par _____________________________\par IMMUNIZATION.\par ___________________ \par ASSESSMENT RECOMMENDATIONS.\par Up to date  with covid pneum and flu \par \par PATIENT DATA.\par INFLUENZA VACCINE.\par 10/2021 \par 10/23/2020 Flu\par PNEUMONIA VACCINE. \par Pneumonia shot  \par COVID VACCINE.\par 2022 Took booster moderna 2021 \par COVID moderan 2021 completed both \par _______________\par SMOKING.\par _________\par ASSESSMENT RECOMMENDATIONS.\par Not smoking \par \par 2020 Quit smoking  1 ppd \par Started age 17 quit at age 56\par No ETOH abuse No drug abuse \par \par _________________________\par LUNG CANCER SCREENING \par ________________\par ASSESSMENT RECOMMENDATIONS.\par Patient quit smoking over 20 y ago at age 56 She is now 76 so does not meet eligibility for lung cancer screening tests\par \par \par ______\par OBESITY.\par __________\par ASSESSMENT RECOMMENDATIONS. \par Not obese \par \par PATIENT DATA.\par WT. \par 2022 144 \par 2022 157\par 2021 154\par 2021 164 lb\par 2020 165 lb     \par 2020 166                        \par BMI. \par 2022 25 \par 2022 27\par 2021 26 \par 2021 28\par 2020 29 \par 2020 29\par \par __________\par SLEEP APNEA.\par _____________\par ASSESSMENT.\par PSG 2020 No sleep apnea\par 2022 Around 3/31/2022 pt was sent to Dr Trevino a [facility] sleep specialist by her Cardiologist Dr Chauncey Burton and repeat psg was done by Dr Trevino and she is awaiting cpap \par \par RECOMMENDATIONS.\par 2022 Pt advised to bring me copy of sleep study next visit\par \par \par

## 2022-06-07 ENCOUNTER — APPOINTMENT (OUTPATIENT)
Dept: PAIN MANAGEMENT | Facility: CLINIC | Age: 77
End: 2022-06-07
Payer: MEDICARE

## 2022-06-07 PROCEDURE — 99214 OFFICE O/P EST MOD 30 MIN: CPT

## 2022-06-07 NOTE — HISTORY OF PRESENT ILLNESS
[Lower back] : lower back [10] : 10 [Radiating] : radiating [Sharp] : sharp [Tingling] : tingling [] : yes [Constant] : constant [Meds] : meds [Injection therapy] : injection therapy [FreeTextEntry1] : b/l  [FreeTextEntry7] : right hip, left thigh  [FreeTextEntry9] : lid patches

## 2022-06-07 NOTE — DISCUSSION/SUMMARY
[de-identified] : After discussing various treatment options with the patient including but not limited to oral medications, physical therapy, exercise modalities as well as interventional spinal injections, we have decided with the following plan:\par \par - Continue Home exercises, stretching, activity modification, physical therapy, and conservative care.\par - Recommend L3-4 Lumbar Epidural Steroid Injection under fluoroscopic guidance with image.\par - The risks, benefits and alternatives of the proposed procedure were explained in detail with the patient. The risks outlined include but are not limited to infection, bleeding, post-dural puncture headache, nerve injury, a temporary increase in pain, failure to resolve symptoms, allergic reaction, symptom recurrence, and possible elevation of blood sugar in diabetics. All questions were answered to patient's apparent satisfaction and he/she verbalized an understanding.\par - Patient is presenting with acute/sub-acute radicular pain with impairment in ADLs and functionality.  The pain has not responded to conservative care including NSAID therapy and/or physical therapy.  There is no bleeding tendency, unstable medical condition, or systemic infection.\par - Follow up in 1-2 weeks post injection for re-evaluation.\par - Patient is on anticoagulation therapy and needs medical clearance to stop medication for the procedure (XARELTO)

## 2022-06-07 NOTE — PHYSICAL EXAM
Informed pt's wife he does not need to stop the Xarelto for the banding. She said he is in the hospital now due to rectal bleeding. He has undergone an upper scope which was normal. He will be having a colonoscopy.   [de-identified] : Constitutional; Appears well, no apparent distress\par Ability to communicate: Normal \par Respiratory: non-labored breathing\par Skin: No rash noted\par Head: Normocephalic, atraumatic\par Neck: no visible thyroid enlargement\par Eyes: Extraocular movements intact\par Neurologic: Alert and oriented x3\par Psychiatric: normal mood, affect and behavior \par \par  [] : non-antalgic

## 2022-06-20 ENCOUNTER — APPOINTMENT (OUTPATIENT)
Dept: PAIN MANAGEMENT | Facility: CLINIC | Age: 77
End: 2022-06-20

## 2022-06-23 ENCOUNTER — RX RENEWAL (OUTPATIENT)
Age: 77
End: 2022-06-23

## 2022-06-27 ENCOUNTER — APPOINTMENT (OUTPATIENT)
Dept: PAIN MANAGEMENT | Facility: CLINIC | Age: 77
End: 2022-06-27
Payer: MEDICARE

## 2022-06-27 ENCOUNTER — APPOINTMENT (OUTPATIENT)
Dept: PULMONOLOGY | Facility: CLINIC | Age: 77
End: 2022-06-27
Payer: MEDICARE

## 2022-06-27 VITALS — WEIGHT: 144 LBS | HEIGHT: 63 IN | BODY MASS INDEX: 25.52 KG/M2

## 2022-06-27 PROCEDURE — 94729 DIFFUSING CAPACITY: CPT

## 2022-06-27 PROCEDURE — 94060 EVALUATION OF WHEEZING: CPT

## 2022-06-27 PROCEDURE — 94727 GAS DIL/WSHOT DETER LNG VOL: CPT

## 2022-06-27 PROCEDURE — 99213 OFFICE O/P EST LOW 20 MIN: CPT

## 2022-06-27 NOTE — DISCUSSION/SUMMARY
[de-identified] : After discussing various treatment options with the patient including but not limited to oral medications, physical therapy, exercise modalities as well as interventional spinal injections, we have decided with the following plan:\par \par - Continue home exercises, stretching, activity modification, physical therapy, and conservative care.\par - Follow-up as needed.\par - Will refill lidocaine patches 5% \par - Recommend Cyclobenzaprine 10mg BID PRN for muscle spasms and to assist with pain relief.\par - Recommend Gabapentin 300mg TID. Recommend to titrate up gabapentin slowly - first taking one pill at night for 3 days, then increasing to twice a day for another 3 days, and then increasing to 3 times a day. Pt instructed not to drive or operate heavy machinery while on medications.\par \par

## 2022-06-27 NOTE — PHYSICAL EXAM
[de-identified] : Constitutional; Appears well, no apparent distress\par Ability to communicate: Normal \par Respiratory: non-labored breathing\par Skin: No rash noted\par Head: Normocephalic, atraumatic\par Neck: no visible thyroid enlargement\par Eyes: Extraocular movements intact\par Neurologic: Alert and oriented x3\par Psychiatric: normal mood, affect and behavior \par \par  [] : non-antalgic

## 2022-06-27 NOTE — HISTORY OF PRESENT ILLNESS
[Lower back] : lower back [10] : 10 [Radiating] : radiating [Sharp] : sharp [Tingling] : tingling [Constant] : constant [Meds] : meds [Injection therapy] : injection therapy [5] : 5 [] : yes [FreeTextEntry1] : b/l  [FreeTextEntry7] : right hip, left, groin, thigh  [FreeTextEntry9] : lid patches [de-identified] : L MRI

## 2022-07-05 ENCOUNTER — APPOINTMENT (OUTPATIENT)
Dept: PAIN MANAGEMENT | Facility: CLINIC | Age: 77
End: 2022-07-05

## 2022-07-06 ENCOUNTER — APPOINTMENT (OUTPATIENT)
Dept: PULMONOLOGY | Facility: CLINIC | Age: 77
End: 2022-07-06

## 2022-07-17 NOTE — PHYSICAL THERAPY INITIAL EVALUATION ADULT - CRITERIA FOR SKILLED THERAPEUTIC INTERVENTIONS
10-yr old girl arrived at the triage for a dog bite on her Lt cheek. Mom reports that the Pt was bit by a neighbors dog 15 minutes PTA. Mom reports that as far as she know, the Dog is fully vaccinated.  
impairments found/functional limitations in following categories/risk reduction/prevention/anticipated equipment needs at discharge

## 2022-08-01 ENCOUNTER — APPOINTMENT (OUTPATIENT)
Dept: PAIN MANAGEMENT | Facility: CLINIC | Age: 77
End: 2022-08-01

## 2022-08-01 VITALS — WEIGHT: 145 LBS | BODY MASS INDEX: 25.69 KG/M2 | HEIGHT: 63 IN

## 2022-08-01 PROCEDURE — 99214 OFFICE O/P EST MOD 30 MIN: CPT

## 2022-08-01 RX ORDER — GABAPENTIN 300 MG/1
300 CAPSULE ORAL
Qty: 30 | Refills: 0 | Status: DISCONTINUED | COMMUNITY
Start: 2022-06-23 | End: 2022-08-01

## 2022-08-01 NOTE — PHYSICAL EXAM
[de-identified] : Constitutional; Appears well, no apparent distress\par Ability to communicate: Normal \par Respiratory: non-labored breathing\par Skin: No rash noted\par Head: Normocephalic, atraumatic\par Neck: no visible thyroid enlargement\par Eyes: Extraocular movements intact\par Neurologic: Alert and oriented x3\par Psychiatric: normal mood, affect and behavior \par \par  [] : ambulation with cane

## 2022-08-01 NOTE — HISTORY OF PRESENT ILLNESS
[Lower back] : lower back [10] : 10 [Radiating] : radiating [Sharp] : sharp [Tingling] : tingling [Constant] : constant [Meds] : meds [Injection therapy] : injection therapy [] : yes [8] : 8 [Dull/Aching] : dull/aching [FreeTextEntry1] : b/l  [FreeTextEntry7] : left groin, thigh  [FreeTextEntry9] : lido patches [de-identified] : L MRI

## 2022-08-01 NOTE — DISCUSSION/SUMMARY
[de-identified] : After discussing various treatment options with the patient including but not limited to oral medications, physical therapy, exercise modalities as well as interventional spinal injections, we have decided with the following plan:\par \par - Continue Home exercises, stretching, activity modification, physical therapy, and conservative care.\par - MRI report and/or images was reviewed and discussed with the patient.\par - Recommend Left L2-3, L3-4 Transforaminal Epidural Steroid Injection under fluoroscopic guidance with image.\par - The risks, benefits and alternatives of the proposed procedure were explained in detail with the patient. The risks outlined include but are not limited to infection, bleeding, post-dural puncture headache, nerve injury, a temporary increase in pain, failure to resolve symptoms, allergic reaction, symptom recurrence, and possible elevation of blood sugar in diabetics. All questions were answered to patient's apparent satisfaction and he/she verbalized an understanding.\par - Patient is presenting with acute/sub-acute radicular pain with impairment in ADLs and functionality.  The pain has not responded to conservative care including NSAID therapy and/or physical therapy.  There is no bleeding tendency, unstable medical condition, or systemic infection.\par - Follow up in 1-2 weeks post injection for re-evaluation.\par \par - Will refill lidocaine patches 5% \par - Recommend Gabapentin 300mg TID. Recommend to titrate up gabapentin slowly - first taking one pill at night for 3 days, then increasing to twice a day for another 3 days, and then increasing to 3 times a day. Pt instructed not to drive or operate heavy machinery while on medications.\par \par

## 2022-08-10 ENCOUNTER — APPOINTMENT (OUTPATIENT)
Dept: PULMONOLOGY | Facility: CLINIC | Age: 77
End: 2022-08-10

## 2022-08-10 VITALS
HEART RATE: 74 BPM | DIASTOLIC BLOOD PRESSURE: 72 MMHG | BODY MASS INDEX: 26.04 KG/M2 | WEIGHT: 147 LBS | TEMPERATURE: 98 F | SYSTOLIC BLOOD PRESSURE: 136 MMHG | OXYGEN SATURATION: 96 %

## 2022-08-10 PROCEDURE — 99214 OFFICE O/P EST MOD 30 MIN: CPT

## 2022-08-10 RX ORDER — METFORMIN ER 500 MG 500 MG/1
500 TABLET ORAL
Qty: 360 | Refills: 0 | Status: ACTIVE | COMMUNITY
Start: 2022-08-08

## 2022-08-10 RX ORDER — METOPROLOL SUCCINATE 200 MG/1
200 TABLET, EXTENDED RELEASE ORAL
Qty: 90 | Refills: 0 | Status: ACTIVE | COMMUNITY
Start: 2022-08-08

## 2022-08-10 RX ORDER — BLOOD SUGAR DIAGNOSTIC
STRIP MISCELLANEOUS
Qty: 100 | Refills: 0 | Status: ACTIVE | COMMUNITY
Start: 2021-07-14

## 2022-08-10 RX ORDER — OLMESARTAN MEDOXOMIL AND HYDROCHLOROTHIAZIDE 40; 25 MG/1; MG/1
40-25 TABLET ORAL
Qty: 47 | Refills: 0 | Status: ACTIVE | COMMUNITY
Start: 2021-10-04

## 2022-08-10 RX ORDER — METHIMAZOLE 10 MG/1
10 TABLET ORAL
Qty: 90 | Refills: 0 | Status: ACTIVE | COMMUNITY
Start: 2022-06-16

## 2022-08-10 RX ORDER — VERAPAMIL HYDROCHLORIDE 180 MG/1
180 TABLET ORAL
Qty: 90 | Refills: 0 | Status: ACTIVE | COMMUNITY
Start: 2022-08-08

## 2022-08-10 RX ORDER — ROSUVASTATIN CALCIUM 5 MG/1
5 TABLET, FILM COATED ORAL
Qty: 90 | Refills: 0 | Status: ACTIVE | COMMUNITY
Start: 2021-08-06

## 2022-08-10 NOTE — ASSESSMENT
[FreeTextEntry1] : \par ___________\par PATIENT PRESENTATION\par ______________\par \par CONTACT. [phone] \par PCP. DR NICOL NUGENT\par REFERRING MD. DR NICOL NUGENT\par INITIAL VISIT DATE .\par ALLERGY. \par  2020 pncl \par Has taken zpak in past without problem )\par HISTORY. \par INITIAL VISIT 2020\par 74 f PMH A fin hypertrophic cardiomyopathy diagnosed with ALICIA 10 y ago and was referred to me for lung nodules sleep apnea and COPD \par She is on xarelto for a fib \par In 2020 she was referred to see sleep specialis Dr Lon Miller \par ___________\par PATIENT DATA\par ______________ \par HABITS.   \par 2020 \par Started age 17 quit at age 56\par Avge 1 ppd \par Quit smoking  \par 40 pyh\par No ETOH abuse No drug abuse \par OCCUPN.     child support\par PETS.   None  \par FAM HO CANCER. \par Uterine Mother  at age 67  \par Esophagus Brother  at age 51 \par FAMILY HO VTE.  No \par FAM HO ASTHMA.  No\par ___________\par PROBLEM LIST\par ______________\par \par     \par LUNG NODULES\par 2022 CT stable subcm nodules Is seeing Dr SAIRA Garrett also\par \par COPD/ASTHMA \par 2022 Is doing well on symbicort 80 1p prn \par 2022 Used it twice a week Has spacer and rinses mouth water after use  \par \par \par POSTNASAL DRIP \par 2022 Flonase causes epistaxis so has stopped using iit Is now using saline spray\par \par SLEEP APNEA\par 2022 Sleep apnea ruled out 2020 psg and is no longer using cpap  \par 2022 Pt now follows with Dr Trevino who repeated sleep study 3/2022 and told her she does have sleep apena and she is awaiting cpap \par \par PREOP PULMONARY KNEE  REPLACEDMENT done  2021 \par 2021 is going for knee replacement surgery Dr Mara HAN VS date   \par \par A fib\par  (on xarelto, s/p ablation in 2017), \par Cardiology, Dr. Chauncey Pierre, [facility] in [place] center for Afib. \par \par PALPITATIONS\par Has Loop recorder for palpitations.\par \par HLD,  \par \par PREOP PULMONARY KNEE  REPLACEDMENT SCHEDULED 2021 \par \par TKR r knee 2021 Dr Araujo \par \par Hyperthyroidism, \par TSH (0.27-4.2) 9/10/2020 TSH .04\par 9/10/2020 TSH was .04 (.27-4.2 Advised to dw her PMD and see endocrine \par \par DM \par Endocrinologist  Dr. Delatorre  hba1c was around 8 in Dec 2017. \par \par GERD\par Gastroenterologist  Dr. Alberts for .\par Ophtho  Dr. Sandro Porter \par GYN  Dr. Liudmila Hess in [place].\par \par SPINAL STENOSIS \par 2020 gets epidural injectionsSalomón Matthews\par \par ____________\par LUNG NODULES\par _______________\par \par HISTORY \par 2018 CXr showed possible mediuastinal mass and patient was referred for CT ch which did not show mediastinal mass but showed subcm lung nodules and one nodule had increased in size \par \par SMOKING \par Started age 17 quit at age 56\par Avge 1 ppd \par Quit smoking  \par 40 pyh\par FAM \par Fam HO Ca Uterine mother  age 67\par Esoph father and brother  age 51\par CTS \par 2018  Seen by Dr SAIRA Garrett 2018\par \par     \par LUNG NODULES\par 2022 CT Ch [facility] 222  cw dating back to 2018 \par DETAILS cw multiple prior studies latest 2021 \par COPD\par Mild apical scarring\par Scattered small circumscribed nodules bl\par 5x5 mm rll sup segment\par 4 mm ant lll \par 3 mm rml\par 3 mm rll \par 5x3 mm ant rll \par 5x5 mm post rll \par Nodules all nsc since 2018 \par IMP Stable bl nodules No new nodules  \par \par ASSESSMENT RECOMMENDATIONS. \par 8/10/2022 plan ct  2023\par __________\par ASTHMA.\par ___________\par HISTORY.\par Intermittent asthma  relieved by symbicort\par \par AEC. 2020 aec 210\par IGE. 2022 ige 348\par \par P BNP. 2022 bnp 346\par \par IMAGING. \par Ct  2022 [facility] subcm nodules all nsc 2018 Mod centrilobular and paraseptal emphysema rosetta upper lobes \par PFTS. \par PFTS 2022 \par FVC 2.00 78% 1.80 (-) 10% \par FEV1 135 77% 141 81% (+4%) \par FEV1/FVC 68%\par FEF 25 75 29%  65% (+127%) \par TLC 71% RV 68% RV/TLC 92% \par DLCO 70% \par MILD OBESTRUCTION NO REVERSIBILITY \par \par \par ASSESSMENT.\par 8/10/2022 Asthma is under good control with symbicort 80 1 p q 4 prn\par She did not need any symboicort in last 2 weeks and does not have episodes of asthma related nocturnal awakening \par \par \par RECOMMENDATIONS.\par 8/10/2022 Continue symbicort with spacer and rinse mouth\par ________________________________\par HOME OXYGEN. \par __________________________ .\par \par PATIENT DATA.\par 8/10/2022 ra rest 96%\par 2022 ra rest 98%\par 2022 RA rest po 96%\par 2021 ra rest 96%\par 2021 ra rest 96%\par 3/20/2019 RA 97% 61\par 2018 po 99% 67\par \par ASSESSMENT RECOMMENDATIONS.\par Does not need home O2 \par _____________________________\par IMMUNIZATION.\par ___________________ \par \par PATIENT DATA.\par INFLUENZA VACCINE.\par 10/2021 \par 10/23/2020 Flu\par PNEUMONIA VACCINE. \par Pneumonia shot  \par COVID VACCINE.\par 2022 Took booster moderna 2021 \par COVID moderan 2021 completed both \par PAST HO COVID. \par \par ASSESSMENT RECOMMENDATIONS.\par Up to date  with covid pneum and flu \par \par _______________\par SMOKING.\par _________\par ASSESSMENT RECOMMENDATIONS.\par Not smoking \par \par 2020 Quit smoking 2002 1 ppd \par Started age 17 quit at age 56\par No ETOH abuse No drug abuse \par \par \par ______\par OBESITY.\par __________\par \par PATIENT DATA.\par WT. \par 8/10/2022 147\par 2022 144 \par 2022 157\par 2021 154\par 2021 164 lb\par 2020 165 lb     \par 2020 166   \par                      \par BMI. \par 8/10/2022 26\par 2022 25 \par 2022 27\par 2021 26 \par 2021 28\par 2020 29 \par 2020 29\par \par ASSESSMENT RECOMMENDATIONS. \par Not obese \par \par __________\par SLEEP APNEA.\par _____________\par PATIENT DATA.\par HSAT 3/31/2022 [facility] \par CC sleep maintenance insomnia and unrefreshing sleep Mallampatti class IV and CVS risk factors Hytn DM PAF [place]  sleepiness ladan 6\par FINDINGS AHI 6.5 During REM AHI 19.4\par \par \par ASSESSMENT.\par 8/10/2022 Pt now has cpap and has been using it for 3 weeks \par \par ASSESSMENT.\par 8/10/2022 Pt now has cpap and has been using it for 3 weeks \par

## 2022-08-10 NOTE — REASON FOR VISIT
[Follow-Up] : a follow-up visit [Asthma] : asthma [Sleep Apnea] : sleep apnea [Pulmonary Nodules] : pulmonary nodules [TextBox_13] : Dr. NICOL NUGENT

## 2022-08-10 NOTE — CONSULT LETTER
[Dear  ___] : Dear  [unfilled], [Consult Letter:] : I had the pleasure of evaluating your patient, [unfilled]. [Consult Closing:] : Thank you very much for allowing me to participate in the care of this patient.  If you have any questions, please do not hesitate to contact me. [FreeTextEntry3] : Yours truly,\par \par Gordon Marquez MD

## 2022-09-13 ENCOUNTER — APPOINTMENT (OUTPATIENT)
Dept: PAIN MANAGEMENT | Facility: CLINIC | Age: 77
End: 2022-09-13

## 2022-09-13 PROCEDURE — J3490M: CUSTOM

## 2022-09-13 PROCEDURE — 64483 NJX AA&/STRD TFRM EPI L/S 1: CPT | Mod: LT

## 2022-09-13 PROCEDURE — 64484 NJX AA&/STRD TFRM EPI L/S EA: CPT | Mod: LT

## 2022-09-13 NOTE — PROCEDURE
[FreeTextEntry3] : Date of Service: 09/13/2022 \par \par Account: [id number]\par \par Patient: MARGA VÁZQUEZ \par \par YOB: 1945\par \par Age: 76 year\par \par \par Surgeon:                                                         Josh Sunshine. D.OVazquez\par \par Assistant:                                                        None\par \par Pre-Operative Diagnosis:                              Lumbosacral radiculitis (M54.17)\par \par Post-Operative Diagnosis:                            Same\par \par Procedure:                                                      Left L2-3, L3-4, transforaminal epidural steroid injection under fluoroscopic guidance.\par \par Anesthesia:                                                     Local with MAC\par \par \par This procedure was carried out using fluoroscopic guidance.  The risks and benefits of the procedure were discussed extensively with the patient.  The consent of the patient was obtained and the following procedure was performed. The patient was placed in the prone position on the fluoroscopic table and the lumbar area was prepped and draped in a sterile fashion. A timeout was performed with all essential staff present and the site and side were verified.\par \par The left L2-3 neural foramen was identified on left oblique "steve dog" anatomical view at the 6 o'clock position using fluoroscopic guidance, and the area was marked. The overlying skin and subcutaneous structures were anesthetized using sterile technique with 1% Lidocaine.  A 22-gauge spinal needle was directed toward the inferior (6 o'clock) position of the pedicle, which formed the roof of the identified foramen.  Once in the epidural space, after negative aspiration for heme and CSF, 1cc of Omnipaque contrast was injected under live fluoroscopy to confirm epidural location and assess filling defects and rule out intravascular needle placement. The following epidurogram was observed: no intravascular or intrathecal flow pattern was noted.  No blood or CSF was aspirated. Contrast spread medially in epidural space.  After this, an injectate of 1 cc preservative free normal saline plus 4 mg of dexamethasone and 1 cc of 0.25% bupivacaine was injected in the epidural space. \par \par The left L3-4 neural foramen was identified on left oblique "steve dog" anatomical view at the 6 o'clock position using fluoroscopic guidance, and the area was marked. The overlying skin and subcutaneous structures were anesthetized using sterile technique with 1% Lidocaine.  A 22-gauge spinal needle was directed toward the inferior (6 o'clock) position of the pedicle, which formed the roof of the identified foramen.  Once in the epidural space, after negative aspiration for heme and CSF, 1cc of Omnipaque contrast was injected under live fluoroscopy to confirm epidural location and assess filling defects and rule out intravascular needle placement. The following epidurogram was observed: no intravascular or intrathecal flow pattern was noted.  No blood or CSF was aspirated. Contrast spread medially in epidural space.  After this, an injectate of 1 cc preservative free normal saline plus 4 mg of dexamethasone and 1 cc of 0.25% Bupivacaine was injected in the epidural space.\par \par The needle was subsequently removed.  Vital signs remained normal.  Pulse oximeter was used throughout the procedure and the patient's pulse and oxygen saturation remained within normal limits.  The patient tolerated the procedure well.  There were no complications.  The patient was instructed to apply ice over the injection sites for twenty minutes every two hours for the next 24 to 48 hours.  The patient was also instructed to contact me immediately if there were any problems.\par \par Josh Sunshine D.O.\par

## 2022-10-04 ENCOUNTER — APPOINTMENT (OUTPATIENT)
Dept: PAIN MANAGEMENT | Facility: CLINIC | Age: 77
End: 2022-10-04

## 2022-10-04 VITALS — HEIGHT: 63 IN | WEIGHT: 293 LBS | BODY MASS INDEX: 51.91 KG/M2

## 2022-10-04 PROCEDURE — 99213 OFFICE O/P EST LOW 20 MIN: CPT

## 2022-10-04 NOTE — PHYSICAL EXAM
[de-identified] : Constitutional; Appears well, no apparent distress\par Ability to communicate: Normal \par Respiratory: non-labored breathing\par Skin: No rash noted\par Head: Normocephalic, atraumatic\par Neck: no visible thyroid enlargement\par Eyes: Extraocular movements intact\par Neurologic: Alert and oriented x3\par Psychiatric: normal mood, affect and behavior \par \par  [] : negative sitting straight leg raise

## 2022-10-04 NOTE — HISTORY OF PRESENT ILLNESS
[Lower back] : lower back [10] : 10 [8] : 8 [Dull/Aching] : dull/aching [Radiating] : radiating [Sharp] : sharp [Tingling] : tingling [Constant] : constant [Meds] : meds [Injection therapy] : injection therapy [] : yes [FreeTextEntry7] : left groin, thigh  [FreeTextEntry9] : lido patches [de-identified] : L MRI  [FreeTextEntry1] : 10/04/2022: s/p Left L2-3, 3-4 TFESI on 9/13/22 with no relief. \par \par 08/01/2022: Pain is on the left side of the lower back and radiates into the left groin and down the left anterior thigh to the knee. Takes tylenol, gabapentin, and lidocaine patches PRN. Not interested in seeing a spine surgeon. \par \par 06/27/2022: Was supposed to be scheduled for a L3-4 LESI but was denied. Need to wait 3 months from 5/24/22. pt here to go over L MRI. Taking gabapentin 300mg QHS. \par \par 06/07/2022: s/p L2-3 LESI on 5/24/22 with 50% relief and improvement of ADLs. Still has some pain on the left groin and down the anterior thigh to the knee. Also has sharp pain on the right lower back. \par \par 3/29/22: pain b/l lower back radiating into the left buttock and groin and down the anterior thigh to the knee described as a sharp shooting burning pain. Takes Tylenol PRN. \par \par 4.26.21: s/p L4-5 LESI on 3.31.21 with 50% relief of pain and improvement of ADLs. Pain down the right leg is much improved but still has some pain in the right ankle. \par \par 3.16.21: LBP started about 1 month ago and is on the b/l lower back and radiates down the right leg to the toes described as a dull constant pain worse with getting up from a seated position. Also with pain in the left groin and anterior thigh. Scheduled for knee replacement in May. \par \par 8.17.20: C7-T1 VIRIDIANA on 8.3.20 with >50% relief of pain and improvement of ADLs. Pain is still radiating down the left arm to the fingers but much less severe. \par \par 7.20.20: Patient comes in for C-spine MRI follow-up. Pain is unchanged since prior visit. Pain radiating down the left arm to the fingers with associated numbness/tingling. \par \par 7.6.20: s/p L4-L5 LESI on 6.22.20 with >50% relief and improvement of ADL's. Now complaining of pain radiating down from the left shoulder to the fingers described as a sharp pain with associated numbness and tingling in the fingers. \par \par 6.1.20: s/p Left L1-L2, L2-L3 TFESI on 5.18.2020 with 40% relief of pain and improvement of ADLs. Pain in the groin has improved and pain no longer radiates passed the knee on the left. Pain is now also on the right side of the lower back. \par \par Initial HPI\par Pain started 2 years ago but has been getting worse. Pain is in the center of the lower back and radiates into the left groin and down the left posterior thigh and lower leg to the ankle described as a tingling/burning pain worse with standing. Has been doing PT and takes flexeril/Tylenol which is helping. \par \par *On Xarelto for A-fib.

## 2022-10-04 NOTE — DISCUSSION/SUMMARY
[de-identified] : After discussing various treatment options with the patient including but not limited to oral medications, physical therapy, exercise modalities as well as interventional spinal injections, we have decided with the following plan:\par \par - Continue home exercises, stretching, activity modification, physical therapy, and conservative care.\par - Follow-up as needed.\par - Recommend Tylenol 1000mg Q8 hours PRN.\par - Recommend continue Gabapentin 300mg TID. Recommend to titrate up gabapentin slowly - first taking one pill at night for 3 days, then increasing to twice a day for another 3 days, and then increasing to 3 times a day. Pt instructed not to drive or operate heavy machinery while on medications.\par - Recommend Cyclobenzaprine 10mg BID PRN for muscle spasms and to assist with pain relief.\par - Recommend to follow-up with an orthopedic specialist for knee/hip pain. \par - Recommend to follow-up with a spine specialist for surgical consultation - however pt not interested at this time. ESIs in the past were helpful but recently have been unsuccessful. \par \par

## 2022-11-01 ENCOUNTER — APPOINTMENT (OUTPATIENT)
Dept: ORTHOPEDIC SURGERY | Facility: CLINIC | Age: 77
End: 2022-11-01

## 2022-11-01 DIAGNOSIS — M25.552 PAIN IN LEFT HIP: ICD-10-CM

## 2022-11-01 DIAGNOSIS — M25.569 PAIN IN UNSPECIFIED KNEE: ICD-10-CM

## 2022-11-01 DIAGNOSIS — M17.12 UNILATERAL PRIMARY OSTEOARTHRITIS, LEFT KNEE: ICD-10-CM

## 2022-11-01 DIAGNOSIS — S73.192A OTHER SPRAIN OF LEFT HIP, INITIAL ENCOUNTER: ICD-10-CM

## 2022-11-01 PROCEDURE — 73562 X-RAY EXAM OF KNEE 3: CPT | Mod: LT

## 2022-11-01 PROCEDURE — 99214 OFFICE O/P EST MOD 30 MIN: CPT

## 2022-11-01 PROCEDURE — 73503 X-RAY EXAM HIP UNI 4/> VIEWS: CPT | Mod: LT

## 2022-11-01 NOTE — ASSESSMENT
[FreeTextEntry1] : 9/7/21: ~ 4 months s/p Right Knee TKA Dr Gomez. Improving again, Improved sleeping, reviewed xrays from 6/30/21, appear well fixed. Primary complaints is difficulty preforming exercise due to ongoing sciatica to the right leg. Discussed post op protocol and recommendations, to resume PT. - overall looks good and TKA is progressing appropriate has some pain light touch at knee doing better with Neurontin - will cont PT for L spine and sciatic like symptoms Knee si doing well\par 10/26/21: R knee is doing well. f/up for r knee in 6 months. Continue with PT for lumbar spine. Advised to f/up with Dr. Sunshine to discuss repeat LESI. \par Informed her of prophylactic antibiotics prior to dental appointments.\par 5/3/22: 1 year s/p R TKA doing well with no pain. She is overall happy and will follow up in 2-3 years. Does have anterior nerve tenderness and is on Gabapentin for that - overall very happy - using cane for her back issues \par \par 11/1/22: Mild L hip and L knee OA. Likely has labral tear. Discussed anti-inflammatories, PT/HEP, IA hip CSI, and briefly CAROL vs. TKA. Recommend diagnostic/therapeutic IA L hip CSI with Dr. Sunshine. Follow up after injection

## 2022-11-01 NOTE — IMAGING
[de-identified] : Right Knee: Inspection of the knee is as follows: no swelling Knee Range of Motion is as follows: Extension: 3 degrees. Flexion: 120 degrees. Gait and function is as follows: ambulation with cane. Ligament stable lateral numbness \par \par LEFT HIP\par + Groin tenderness\par (-) Greater troch bursa tenderness\par + Buttock tenderness \par Good ROM\par Pain in groin internal rotation\par Mild impingement \par NVI\par \par LEFT KNEE\par No Effusion\par + Medial joint line tenderness \par Anterior tenderness \par ROM 0-95\par NVI\par

## 2022-11-01 NOTE — HISTORY OF PRESENT ILLNESS
[10] : 10 [Burning] : burning [Radiating] : radiating [Sharp] : sharp [Constant] : constant [Rest] : rest [Meds] : meds [] : yes [de-identified] : 11/1/22: She presents today with left hip/groin pain and left anterior knee pain for the past 6 months with no injury. She has been treated by Dr. Sunshine for her L-spine. Recieved a LESI in 9/2022 that did not provide any relief. \par \par Previous doc:\par 9/7/21: Ms. Fatimah Troncoso, a 75-year-old female, s/p 3.5 months R TKA. NC from Dr. Arrington. feeling better, sleeping better, using Tylenol as preference, pain meds for significant pain. Primary complaint is preforming exercises due to ongoing sciatica right side. PT reports last epidural injections preformed prior to her knee replacement. \par 10/26/21: 5 months s/p R TKA. Her R knee is doing well. The right-sided lumbar radicular symptoms is of most concern to her. PT and HEP are temporarily beneficial. LESI from 04/2021 by Dr. Sunshine had been very beneficial. \par 5/2/22: 1 year s/p R TKA doing well. She has transitioned from PT to HEP.  [FreeTextEntry1] : L Hip/ Knee [FreeTextEntry5] : MARGA 77 year old F here for  l hip / knee ,onset pain for about 6 months,  she has discomfort/ burning sensation  in her groin area, has ah radiating pain from her L hip to her knee, she also has a burning sensation under her knee, she is currently taking gabapentin and tizanidine , which provides temporary relief \par her pain was being manage by Dr. Sunshine, she has tried PT only relief during her sessions,  [FreeTextEntry7] : L hip to knee [de-identified] : Dr. Sunshine  [de-identified] : R knee replacement  [de-identified] : a few months ago

## 2022-11-01 NOTE — DISCUSSION/SUMMARY
[de-identified] : The natural progression of Osteoarthritis was explained to the patient.  We discussed the possible treatment options from conservative to operative.  These included NSAIDS, Glucosamine and Chondrotin sulfate, and Physical Therapy as well different types of injections.  We also discussed that at some point they may progress to needed a TKA and/or CAROL.  Information and pamphlets were given. \par \par Progress note completed by Mary Ortega PA-C.\par The documentation recorded by the PA accurately reflects the service I personally performed and the decisions made by me. -Dr. Golden

## 2022-11-07 LAB — SARS-COV-2 N GENE NPH QL NAA+PROBE: NOT DETECTED

## 2022-11-09 ENCOUNTER — APPOINTMENT (OUTPATIENT)
Age: 77
End: 2022-11-09
Payer: MEDICARE

## 2022-11-09 PROCEDURE — 27095 INJECTION FOR HIP X-RAY: CPT | Mod: LT

## 2022-11-09 PROCEDURE — 73525 CONTRAST X-RAY OF HIP: CPT | Mod: 26,LT

## 2022-12-06 ENCOUNTER — APPOINTMENT (OUTPATIENT)
Dept: PAIN MANAGEMENT | Facility: CLINIC | Age: 77
End: 2022-12-06

## 2022-12-06 VITALS — BODY MASS INDEX: 25.87 KG/M2 | HEIGHT: 63 IN | WEIGHT: 146 LBS

## 2022-12-06 DIAGNOSIS — M54.50 LOW BACK PAIN, UNSPECIFIED: ICD-10-CM

## 2022-12-06 DIAGNOSIS — M54.17 RADICULOPATHY, LUMBOSACRAL REGION: ICD-10-CM

## 2022-12-06 PROCEDURE — 99213 OFFICE O/P EST LOW 20 MIN: CPT

## 2022-12-06 NOTE — DISCUSSION/SUMMARY
[de-identified] : After discussing various treatment options with the patient including but not limited to oral medications, physical therapy, exercise modalities as well as interventional spinal injections, we have decided with the following plan:\par \par - Continue home exercises, stretching, activity modification, physical therapy, and conservative care.\par - Follow-up as needed.\par - Recommend Tylenol 1000mg Q8 hours PRN.\par - Recommend continue Gabapentin 300mg TID. Recommend to titrate up gabapentin slowly - first taking one pill at night for 3 days, then increasing to twice a day for another 3 days, and then increasing to 3 times a day. Pt instructed not to drive or operate heavy machinery while on medications.\par - Recommend continue Cyclobenzaprine 10mg BID PRN for muscle spasms and to assist with pain relief.\par

## 2022-12-06 NOTE — PHYSICAL EXAM
[de-identified] : Constitutional; Appears well, no apparent distress\par Ability to communicate: Normal \par Respiratory: non-labored breathing\par Skin: No rash noted\par Head: Normocephalic, atraumatic\par Neck: no visible thyroid enlargement\par Eyes: Extraocular movements intact\par Neurologic: Alert and oriented x3\par Psychiatric: normal mood, affect and behavior \par \par  [] : negative sitting straight leg raise

## 2022-12-06 NOTE — HISTORY OF PRESENT ILLNESS
[Lower back] : lower back [10] : 10 [8] : 8 [Dull/Aching] : dull/aching [Radiating] : radiating [Sharp] : sharp [Tingling] : tingling [Constant] : constant [Meds] : meds [Injection therapy] : injection therapy [] : yes [FreeTextEntry1] : b/l  [FreeTextEntry7] : left groin, thigh  [FreeTextEntry9] : lido patches [de-identified] : L MRI

## 2022-12-14 ENCOUNTER — APPOINTMENT (OUTPATIENT)
Dept: PULMONOLOGY | Facility: CLINIC | Age: 77
End: 2022-12-14

## 2022-12-14 PROCEDURE — 99214 OFFICE O/P EST MOD 30 MIN: CPT

## 2022-12-14 RX ORDER — TIOTROPIUM BROMIDE 18 UG/1
18 CAPSULE ORAL; RESPIRATORY (INHALATION) DAILY
Qty: 30 | Refills: 5 | Status: ACTIVE | COMMUNITY
Start: 2022-12-14 | End: 1900-01-01

## 2022-12-14 NOTE — ASSESSMENT
[FreeTextEntry1] : \par ___________\par PATIENT PRESENTATION\par ______________\par \par CONTACT. [phone] \par PCP. DR NICOL NUGENT\par REFERRING MD. DR NICOL NUGENT\par INITIAL VISIT DATE .\par ALLERGY. \par  2020 pncl \par Has taken zpak in past without problem )\par HISTORY. \par INITIAL VISIT 2020\par 74 f PMH A fin hypertrophic cardiomyopathy diagnosed with ALICIA 10 y ago and was referred to me for lung nodules sleep apnea and COPD \par She is on xarelto for a fib \par In 2020 she was referred to see sleep specialis Dr Lon Miller \par ___________\par PATIENT DATA\par ______________ \par HABITS.   \par 2020 \par Started age 17 quit at age 56\par Avge 1 ppd \par Quit smoking  \par 40 pyh\par No ETOH abuse No drug abuse \par OCCUPN.     child support\par PETS.   None  \par FAM HO CANCER. \par Uterine Mother  at age 67  \par Esophagus Brother  at age 51 \par FAMILY HO VTE.  No \par FAM HO ASTHMA.  No\par ___________\par PROBLEM LIST\par ______________\par \par     \par LUNG NODULES\par 2022 CT stable subcm nodules Is seeing Dr SAIRA Garrett also\par \par COPD/ASTHMA \par 2022 Is doing well on symbicort 80 1p prn \par 2022 Used it twice a week Has spacer and rinses mouth water after use  \par \par \par POSTNASAL DRIP \par 2022 Flonase causes epistaxis so has stopped using iit Is now using saline spray\par \par SLEEP APNEA\par 2022 Sleep apnea ruled out 2020 psg and is no longer using cpap  \par 2022 Pt now follows with Dr Trevino who repeated sleep study 3/2022 and told her she does have sleep apena and she is awaiting cpap \par \par \par A fib\par  (on xarelto, s/p ablation in 2017), \par Cardiology, Dr. Chauncey Pierre, [facility] in [facility] for Afib. \par \par PALPITATIONS\par Has Loop recorder for palpitations.\par \par HLD,  \par \par PREOP PULMONARY KNEE  REPLACEDMENT SCHEDULED 2021 \par \par TKR r knee 2021 Dr Araujo \par \par Hyperthyroidism, \par TSH (0.27-4.2) 9/10/2020 TSH .04\par 9/10/2020 TSH was .04 (.27-4.2 Advised to dw her PMD and see endocrine \par \par DM \par Endocrinologist  Dr. Delatorre  hba1c was around 8 in Dec 2017. \par \par GERD\par Gastroenterologist  Dr. Aristeo velazquez .\par Ophtho  Dr. Sandro Porter \par GYN  Dr. Liudmila Hess in [place].\par \par SPINAL STENOSIS \par 2020 gets epidural injectionsSalomón Matthews\par \par ___________\par ASSESSMENT RECOMMENDATIONS\par ______________\par \par ___________\par LUNG NODULES\par ___________\par ..  40 PYH smoking  quit age 56 in  \par .. Fam HO ca Uterine mother  age 67\par Esoph father and brother  age 51\par .. 2022 CT  [facility]  cw dating back to 2018 \par DETAILS cw multiple prior studies latest 2021 \par COPD\par Mild apical scarring\par Scattered small circumscribed nodules bl\par 5x5 mm rll sup segment\par 4 mm ant lll \par 3 mm rml\par 3 mm rll \par 5x3 mm ant rll \par 5x5 mm post rll \par Nodules all nsc since 2018 \par IMP Stable bl nodules No new nodules  \par \par ISABEL\par .. 2022 Plan ct  2023 and see me \par .. 2022 she has appt tonsee Dr SAIRA Garrett 1/10/2023\par \par __________\par ASTHMA.\par ___________\par HISTORY.\par Intermittent asthma  relieved by symbicort\par \par AEC. 2020 aec 210\par IGE. 2022 ige 348\par \par P BNP. 2022 bnp 346\par \par IMAGING. \par Ct Ch 2022 [facility] subcm nodules all nsc 2018 Mod centrilobular and paraseptal emphysema rosetta upper lobes \par PFTS. \par PFTS 2022 \par FVC 2.00 78% 1.80 (-) 10% \par FEV1 135 77% 141 81% (+4%) \par FEV1/FVC 68%\par FEF 25 75 29%  65% (+127%) \par TLC 71% RV 68% RV/TLC 92% \par DLCO 70% \par MILD OBESTRUCTION NO REVERSIBILITY \par \par \par EXACERBATIONS IN PREVIOUS 1 YEAR.\par 10/2022 Course of pred exacerbn rxed as outpr\par LAST FEV1.\par 2020 FEV1 71%\par ___\par DYSPNEA. \par MMRC SCORE.\par 2022 MMRC 3 sob walking a block \par 2022 MMRC 3 Shoirtness of breath is eexertional Gets short of breath climbing up stairs \par \par ASSESSMENT.\par .. 2022 Patient had a recent exacerbation and is only on symbicort 80 \par \par \par RECOMMENDATIONS.\par .. 2022 Add spiriva\par .. 2022 RTC 2 m \par __________\par SLEEP APNEA.\par _____________\par PATIENT DATA.\par HSAT 3/31/2022 [facility] \par CC sleep maintenance insomnia and unrefreshing sleep Mallampatti class IV and CVS risk factors Hytn DM PAF Hornick  sleepiness ladan 6\par FINDINGS AHI 6.5 During REM AHI 19.4\par \par \par ASSESSMENT.\par 2022 using machine 6 h a noght \par \par \par

## 2023-01-10 ENCOUNTER — APPOINTMENT (OUTPATIENT)
Dept: THORACIC SURGERY | Facility: CLINIC | Age: 78
End: 2023-01-10
Payer: MEDICARE

## 2023-01-10 VITALS
DIASTOLIC BLOOD PRESSURE: 76 MMHG | WEIGHT: 145 LBS | OXYGEN SATURATION: 98 % | BODY MASS INDEX: 25.69 KG/M2 | HEART RATE: 65 BPM | HEIGHT: 63 IN | SYSTOLIC BLOOD PRESSURE: 123 MMHG | RESPIRATION RATE: 16 BRPM

## 2023-01-10 PROCEDURE — 99214 OFFICE O/P EST MOD 30 MIN: CPT

## 2023-01-10 NOTE — HISTORY OF PRESENT ILLNESS
[FreeTextEntry1] : 77 year old female former smoker with history of A-fib (on Xarelto, s/p ablation 5/2017) hyperlipidemia, and hyperthyroidism, GERD, Diabetes and left sided implanted Cardiac Loop recorder. She is referred by Dr. Gordon Marquez. As per his note: the patient had a CXR in June 2018 which revealed an anterior mediastinal mass. CT Scan of the Chest revealed bilateral pulmonary nodules. \par  \par CT Chest on 5/29/19:\par - stable 5mm RLL\par - stable 3mm LLL\par - emphysematous lung changes\par \par CT Chest on 11/7/19:\par - stable 5 mm nodule RLL\par - 3-4 mm nodule, stable \par \par PFTs on 9/18/2020: FVC 69%, FEV1 71%, DLCO 81%.\par \par CT Chest on 1/6/21:\par - stable moderate emphysema\par - stable, small lung nodules bilaterally since 12/2018: 5 x 5mm RLL (7:106); 4 x 3mm LLL (7:153); 3mm RML (7:164); 3mm RLL (7:184); 5 x 3mm RLL (7:204); 5 x 5mm RLL (7:228)\par - no new lung lesions\par \par CT Chest on 1/5/22:\par - Stable, bilateral pulmonary nodules dating back to 12/13/2018. No new nodules are noted \par \par CT chest on 01/06/2023 at [facility]: \par - no enlarged mediastinal or hilar LNs\par - multiple stable nodules: 5 x 5mm RLL (7:97); 4mm LLL (7:145); 3mm RML (7:144); 3mm RLL (7:165); 5 x 3mm RLL (7:184); 5 x 5mm RLL (7:202)\par \par Patient is here today for a follow up. Denies SOB, CP, cough.

## 2023-01-10 NOTE — ASSESSMENT
[FreeTextEntry1] : 77 year old female former smoker with history of A-fib (on Xarelto, s/p ablation 5/2017) hyperlipidemia, and hyperthyroidism, GERD, Diabetes and left sided implanted Cardiac Loop recorder. \par \par CT chest on 01/06/2023 at [facility]: \par - no enlarged mediastinal or hilar LNs\par - multiple stable nodules: 5 x 5mm RLL (7:97); 4mm LLL (7:145); 3mm RML (7:144); 3mm RLL (7:165); 5 x 3mm RLL (7:184); 5 x 5mm RLL (7:202)\par  \par I have reviewed the patient's medical records and diagnostic images at time of this office consultation and have made the following recommendation:\par 1. CT scan showed stable nodules, I recommended patient to f/u with PCP and Pulm, return to office as needed.\par \par \par I, JEFFREY Santos, personally performed the evaluation and management (E/M) services for this established patient who presents today with (a) new problem(s)/exacerbation of (an) existing condition(s). That E/M includes conducting the examination, assessing all new/exacerbated conditions, and establishing a new plan of care. Today, my ACP, Imelda Finley NP was here to observe my evaluation and management services for this new problem/exacerbated condition to be followed going forward.\par \par

## 2023-01-10 NOTE — CONSULT LETTER
[Consult Letter:] : I had the pleasure of evaluating your patient, [unfilled]. [( Thank you for referring [unfilled] for consultation for _____ )] : Thank you for referring [unfilled] for consultation for [unfilled] [Please see my note below.] : Please see my note below. [Consult Closing:] : Thank you very much for allowing me to participate in the care of this patient.  If you have any questions, please do not hesitate to contact me. [Sincerely,] : Sincerely, [FreeTextEntry2] : Gordon Marquez MD (Pulm) [FreeTextEntry3] : Horacio Garrett MD\par Director of Thoracic, [facility]\par Cardiovascular & Thoracic Surgery\par \par [facility]\par [address]\par Oncology Building 3rd Fl\[place] [address]\par Tel: [phone]\par Fax: [phone]\par

## 2023-03-01 ENCOUNTER — APPOINTMENT (OUTPATIENT)
Dept: PULMONOLOGY | Facility: CLINIC | Age: 78
End: 2023-03-01
Payer: MEDICARE

## 2023-03-01 VITALS
RESPIRATION RATE: 16 BRPM | HEIGHT: 63 IN | TEMPERATURE: 98 F | SYSTOLIC BLOOD PRESSURE: 144 MMHG | WEIGHT: 138 LBS | DIASTOLIC BLOOD PRESSURE: 83 MMHG | BODY MASS INDEX: 24.45 KG/M2 | OXYGEN SATURATION: 98 % | HEART RATE: 89 BPM

## 2023-03-01 DIAGNOSIS — R06.02 SHORTNESS OF BREATH: ICD-10-CM

## 2023-03-01 PROCEDURE — 99214 OFFICE O/P EST MOD 30 MIN: CPT

## 2023-03-01 NOTE — ASSESSMENT
[FreeTextEntry1] : \par ___________\par PATIENT PRESENTATION\par ______________\par \par CONTACT. [phone] \par PCP. DR NICOL NUGENT\par REFERRING MD. DR NICOL NUGENT\par INITIAL VISIT DATE .\par ALLERGY. \par  2020 pncl \par Has taken zpak in past without problem )\par HISTORY. \par INITIAL VISIT 2020\par 74 f PMH A fin hypertrophic cardiomyopathy diagnosed with ALICIA 10 y ago and was referred to me for lung nodules sleep apnea and COPD \par She is on xarelto for a fib \par In 2020 she was referred to see sleep specialis Dr Lon Miller \par ___________\par PATIENT DATA\par ______________ \par HABITS.   \par 2020 \par Started age 17 quit at age 56\par Avge 1 ppd \par Quit smoking  \par 40 pyh\par No ETOH abuse No drug abuse \par OCCUPN.     child support\par PETS.   None  \par FAM HO CANCER. \par Uterine Mother  at age 67  \par Esophagus Brother  at age 51 \par FAMILY HO VTE.  No \par FAM HO ASTHMA.  No\par ___________\par PROBLEM LIST\par ______________\par \par     \par LUNG NODULES\par 2022 CT stable subcm nodules Is seeing Dr SAIRA Garrett also\par \par COPD/ASTHMA \par 2022 Is doing well on symbicort 80 1p prn \par 2022 Used it twice a week Has spacer and rinses mouth water after use  \par \par EXACERBATION\par .. 10/2022 Had course of prednisone x 10 d \par \par \par POSTNASAL DRIP \par 2022 Flonase causes epistaxis so has stopped using iit Is now using saline spray\par \par SLEEP APNEA\par 2022 Sleep apnea ruled out 2020 psg and is no longer using cpap  \par 2022 Pt now follows with Dr Trevino who repeated sleep study 3/2022 and told her she does have sleep apena and she is awaiting cpap \par \par \par \par ___________\par ASSESSMENT LUNG NODULES\par ___________\par ..  40 PYH smoking  quit age 56 in  \par .. Fam HO ca Uterine mother  age 67\par    Esoph father and brother  age 51\par .. 2022 CT  [facility]  \par     cw dating back to 2018 \par     DETAILS cw multiple prior studies\par      latest 2021 \par      COPD\par       Mild apical scarring\par       Scattered small circumscribed nodules bl\par      5x5 mm rll sup segment\par      4 mm ant lll \par      3 mm rml\par      3 mm rll \par      5x3 mm ant rll \par     5x5 mm post rll \par     Nodules all nsc since 2018 \par .. IMP Stable bl nodules No new nodules  \par \par .. 2023 CT  [facility] multiple nodules stable   \par .. 5x5 mm RLL 4 mm LLL 3 mm RLL 3 mm RLL 5x5 mm RLL \par .. 1/10/2023 seen by CTS Dr SAIRA Garrett\par \par \par \par ___________\par ASSESSMENT SLEEP APNEA\par __________\par SLEEP APNEA\par .. 2022 Sleep apnea ruled out 2020 psg and is no longer using cpap  \par .. 2022 Pt now follows with Dr Trevino who repeated sleep study 3/2022 and told her she does have sleep apena and she is awaiting cpap \par .. 3/1/2023 Is using CPAP every day Uses it 4-7 h daiy\par \par \par __________\par ASSESSMENT\par RHINITIS\par _________ \par 2022 Flonase causes epistaxis so has stopped using iit Is now using saline spray\par _____\par ASSESSMENT COPD\par ________\par HABITS\par ..  40 PYH smoking  quit age 56 in  \par AEC. \par .. 2020 aec 210\par IGE. \par .. 2022 ige 348\par P BNP. \par .. 2022 bnp 346\par IMAGING. \par .. Ct Ch 2022 [facility] subcm nodules all nsc 2018 Mod centrilobular and paraseptal emphysema rosetta upper lobes \par PFTS. \par .. PFTS 2022 \par FVC 2.00 78% 1.80 (-) 10% \par FEV1 135 77% 141 81% (+4%) \par FEV1/FVC 68%\par FEF 25 75 29%  65% (+127%) \par TLC 71% RV 68% RV/TLC 92% \par DLCO 70% \par MILD OBESTRUCTION NO REVERSIBILITY \par \par \par MEDS\par .. 3/1/2023 symbicort 80 2p bid on average \par .. 3/1/2023 spiriva \par \par SUBJECTIVE \par .. 3/1/2023 No nocturnal awakenings\par .. 3/1/2023 No er visists for sob \par .. MMRC 2 Cannot keep with other people her age \par \par ___________\par RECOMMENDATION\par LUNG NODULES\par ___________\par .. 3/1/2023 lung nodules below 6 mm \par .. 3/1/2023 will consider repeat ct in 1 y\par \par __________\par RECOMMENDATIONS\par COPD/ASTHMA \par ___________\par ..  3/1/2023 Pt was explained that she can change to symbicort 80 1 p wq 4 h as needed and no more than 10 p/d\par .. 3/1/2023 reinforced to use spacer which she has  and is rinsing mouth woth water after use \par .. 3/1/2023 refer PFTS \par

## 2023-03-01 NOTE — REASON FOR VISIT
[Follow-Up] : a follow-up visit [Sleep Apnea] : sleep apnea [COPD] : COPD [Pulmonary Nodules] : pulmonary nodules [TextBox_13] : Dr. NICOL NUGENT

## 2023-03-30 ENCOUNTER — RX RENEWAL (OUTPATIENT)
Age: 78
End: 2023-03-30

## 2023-03-30 RX ORDER — LIDOCAINE 5% 700 MG/1
5 PATCH TOPICAL
Qty: 30 | Refills: 0 | Status: ACTIVE | COMMUNITY
Start: 2022-06-27 | End: 1900-01-01

## 2023-04-24 ENCOUNTER — APPOINTMENT (OUTPATIENT)
Dept: PAIN MANAGEMENT | Facility: CLINIC | Age: 78
End: 2023-04-24
Payer: MEDICARE

## 2023-04-24 VITALS — BODY MASS INDEX: 24.45 KG/M2 | HEIGHT: 63 IN | WEIGHT: 138 LBS

## 2023-04-24 DIAGNOSIS — M16.12 UNILATERAL PRIMARY OSTEOARTHRITIS, LEFT HIP: ICD-10-CM

## 2023-04-24 PROCEDURE — 99214 OFFICE O/P EST MOD 30 MIN: CPT

## 2023-04-24 NOTE — HISTORY OF PRESENT ILLNESS
[Lower back] : lower back [Dull/Aching] : dull/aching [Radiating] : radiating [Tingling] : tingling [Constant] : constant [Meds] : meds [Injection therapy] : injection therapy [] : yes [9] : 9 [7] : 7 [Standing] : standing [Walking] : walking [FreeTextEntry1] : b/l  [FreeTextEntry7] : left groin, thigh  [FreeTextEntry9] : lido patches [de-identified] : L MRI

## 2023-04-24 NOTE — PHYSICAL EXAM
[de-identified] : Constitutional; Appears well, no apparent distress\par Ability to communicate: Normal \par Respiratory: non-labored breathing\par Skin: No rash noted\par Head: Normocephalic, atraumatic\par Neck: no visible thyroid enlargement\par Eyes: Extraocular movements intact\par Neurologic: Alert and oriented x3\par Psychiatric: normal mood, affect and behavior \par \par  [] : negative sitting straight leg raise

## 2023-04-24 NOTE — DISCUSSION/SUMMARY
[de-identified] : After discussing various treatment options with the patient including but not limited to oral medications, physical therapy, exercise modalities as well as interventional spinal injections, we have decided with the following plan:\par \par - Continue Home exercises, stretching, activity modification, physical therapy, and conservative care.\par - Recommend LEFT hip injection under fluoroscopic guidance with image.\par - The risks, benefits and alternatives of the proposed procedure were explained in detail with the patient. The risks outlined include but are not limited to infection, bleeding, nerve injury, a temporary increase in pain, failure to resolve symptoms, allergic reaction, symptom recurrence, and possible elevation of blood sugar in diabetics. All questions were answered to patient's apparent satisfaction and he/she verbalized an understanding.\par - The pain has not responded to conservative care including NSAID therapy and/or physical therapy.  There is no bleeding tendency, unstable medical condition, or systemic infection.\par - Follow up in 1-2 weeks post injection for re-evaluation.\par \par - Recommend continue Gabapentin 300mg TID. Recommend to titrate up gabapentin slowly - first taking one pill at night for 3 days, then increasing to twice a day for another 3 days, and then increasing to 3 times a day. Pt instructed not to drive or operate heavy machinery while on medications.\par - Recommend continue Cyclobenzaprine 10mg BID PRN for muscle spasms and to assist with pain relief.\par

## 2023-04-26 ENCOUNTER — RX RENEWAL (OUTPATIENT)
Age: 78
End: 2023-04-26

## 2023-04-28 RX ORDER — CYCLOBENZAPRINE HYDROCHLORIDE 10 MG/1
10 TABLET, FILM COATED ORAL TWICE DAILY
Qty: 60 | Refills: 0 | Status: DISCONTINUED | COMMUNITY
Start: 2022-06-27 | End: 2023-04-28

## 2023-05-10 ENCOUNTER — RX RENEWAL (OUTPATIENT)
Age: 78
End: 2023-05-10

## 2023-05-22 ENCOUNTER — APPOINTMENT (OUTPATIENT)
Age: 78
End: 2023-05-22
Payer: MEDICARE

## 2023-05-22 PROCEDURE — 73525 CONTRAST X-RAY OF HIP: CPT | Mod: 26,LT

## 2023-05-22 PROCEDURE — 27095 INJECTION FOR HIP X-RAY: CPT | Mod: LT

## 2023-06-06 ENCOUNTER — APPOINTMENT (OUTPATIENT)
Dept: PAIN MANAGEMENT | Facility: CLINIC | Age: 78
End: 2023-06-06
Payer: MEDICARE

## 2023-06-06 VITALS — BODY MASS INDEX: 24.45 KG/M2 | WEIGHT: 138 LBS | HEIGHT: 63 IN

## 2023-06-06 PROCEDURE — 99213 OFFICE O/P EST LOW 20 MIN: CPT

## 2023-06-06 NOTE — DISCUSSION/SUMMARY
[de-identified] : After discussing various treatment options with the patient including but not limited to oral medications, physical therapy, exercise modalities as well as interventional spinal injections, we have decided with the following plan:\par \par - Continue home exercises, stretching, activity modification, physical therapy, and conservative care.\par - Follow-up as needed.\par - Recommend continue Gabapentin 300mg TID. Recommend to titrate up gabapentin slowly - first taking one pill at night for 3 days, then increasing to twice a day for another 3 days, and then increasing to 3 times a day. Pt instructed not to drive or operate heavy machinery while on medications.\par - Recommend continue Cyclobenzaprine 10mg BID PRN for muscle spasms and to assist with pain relief.\par

## 2023-06-06 NOTE — HISTORY OF PRESENT ILLNESS
[Lower back] : lower back [7] : 7 [5] : 5 [Dull/Aching] : dull/aching [Radiating] : radiating [Sharp] : sharp [Stabbing] : stabbing [Tingling] : tingling [Constant] : constant [Meds] : meds [Injection therapy] : injection therapy [Standing] : standing [Walking] : walking [] : yes [FreeTextEntry1] : b/l  [FreeTextEntry7] : left groin, thigh  [FreeTextEntry9] : lido patches [de-identified] : L MRI

## 2023-06-06 NOTE — PHYSICAL EXAM
[de-identified] : Constitutional; Appears well, no apparent distress\par Ability to communicate: Normal \par Respiratory: non-labored breathing\par Skin: No rash noted\par Head: Normocephalic, atraumatic\par Neck: no visible thyroid enlargement\par Eyes: Extraocular movements intact\par Neurologic: Alert and oriented x3\par Psychiatric: normal mood, affect and behavior \par \par  [] : negative sitting straight leg raise

## 2023-06-09 ENCOUNTER — RX RENEWAL (OUTPATIENT)
Age: 78
End: 2023-06-09
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
## 2023-06-28 ENCOUNTER — RX RENEWAL (OUTPATIENT)
Age: 78
End: 2023-06-28

## 2023-07-05 ENCOUNTER — APPOINTMENT (OUTPATIENT)
Dept: PULMONOLOGY | Facility: CLINIC | Age: 78
End: 2023-07-05
Payer: MEDICARE

## 2023-07-05 VITALS
DIASTOLIC BLOOD PRESSURE: 71 MMHG | HEART RATE: 80 BPM | HEIGHT: 63 IN | WEIGHT: 148 LBS | OXYGEN SATURATION: 99 % | BODY MASS INDEX: 26.22 KG/M2 | SYSTOLIC BLOOD PRESSURE: 123 MMHG

## 2023-07-05 PROCEDURE — 99214 OFFICE O/P EST MOD 30 MIN: CPT | Mod: 25

## 2023-07-05 PROCEDURE — 94727 GAS DIL/WSHOT DETER LNG VOL: CPT

## 2023-07-05 PROCEDURE — 94729 DIFFUSING CAPACITY: CPT

## 2023-07-05 PROCEDURE — 94010 BREATHING CAPACITY TEST: CPT

## 2023-07-05 NOTE — ASSESSMENT
[FreeTextEntry1] : \par ___________\par PATIENT DATA\par ______________\par \par CONTACT. [phone] \par PCP. DR NICOL NUGENT\par REFERRING MD. DR NICOL NUGENT\par INITIAL VISIT DATE .\par ALLERGY. \par 2020 pncl \par Has taken zpak in past without problem )\par HISTORY. \par INITIAL VISIT 2020\par 74 f PMH A fin hypertrophic cardiomyopathy diagnosed with ALICIA 10 y ago and was referred to me for lung nodules sleep apnea and COPD \par She is on xarelto for a fib \par In 2020 she was referred to see sleep specialis Dr Lon Miller \par HABITS. \par 2020 \par Started age 17 quit at age 56\par Avge 1 ppd \par Quit smoking  \par 40 pyh\par No ETOH abuse No drug abuse \par OCCUPN.  child support\par PETS. None \par FAM HO CANCER. \par Uterine Mother  at age 67 \par Esophagus Brother  at age 51 \par FAMILY HO VTE. No \par FAM HO ASTHMA. No\par ___________\par PROBLEM LIST\par ______________\par \par LUNG NODULES\par .. 2022 CT stable subcm nodules Is seeing Dr SAIRA Garrett also\par .. 2023 CT [facility] multiple nodules stable \par .. 1/10/2023 seen by CTS Dr SAIRA Garrett\par \par COPD/ASTHMA \par .. 2022 Is doing well on symbicort 80 1p prn \par .. 2022 Used it twice a week Has spacer and rinses mouth water after use \par \par EXACERBATION\par .. 10/2022 Had course of prednisone x 10 d \par \par \par POSTNASAL DRIP \par .. 2022 Flonase causes epistaxis so has stopped using iit Is now using saline spray\par \par SLEEP APNEA\par 2022 Sleep apnea ruled out 2020 psg and is no longer using cpap \par 2022 Pt now follows with Dr Trevino who repeated sleep study 3/2022 and told her she does have sleep apena and she is awaiting cpap \par .. 3/1/2023 Is using CPAP every day Uses it 4-7 h daiy\par A fib\par .. (on xarelto, s/p ablation in 2017), \par Cardiology, Dr. Chauncey Pierre, [facility] in [place] center for Afib. \par \par PALPITATIONS\par .. Has Loop recorder for palpitations.\par \par Hyperthyroidism, \par . TSH (0.27-4.2) 9/10/2020 TSH .04\par 9/10/2020 TSH was .04 (.27-4.2 Advised to dw her PMD and see endocrine \par \par DM \par . Endocrinologist Dr. Delatorre hba1c was around 8 in Dec 2017. \par \par GERD\par . Gastroenterologist Dr. Alberts for .\par . Ophtho Dr. Sandro Porter \par . GYN Dr. Liudmila Hess in [place].\par \par SPINAL STENOSIS \par . 2020 gets epidural injectionsSalomón Matthews\par ___________\par ASSESSMENT/RECOMMENDATIONS.\par ___________\par \par IMMUNIZATION.\par .. 3/1/2023 Has PPSV 2020\par .. 3/1/2023 Advised flu shot every fall \par .. 3/1/2023 Advised to remain up to date with covid vaccines \par HOME OXYGEN.\par .. 2023 ra rest 99%\par .. 3/1/2023 ra rest 98%\par .. does not need home O2 \par OBESITY.\par .. 2023 148\par .. 3/1/2023 wt 138\par .. 2023 bmi 26 \par .. 3/1/2023 bmi 24\par .. Not obese \par LUNG CANCER SCREENING.\par .. 3/1/2023\par .. Started age 17 quit at age 56\par .. Avge 1 ppd \par .. .. Quit smoking  \par 40 pyh\par .. is getting periodic ct scans for known lung nodules\par SMOKING.\par .. Not smoking\par SLEEP APNEA\par .. 2022 Sleep apnea ruled out 2020 psg and is no longer using cpap \par .. 2022 Pt now follows with Dr Trevino who repeated sleep study 3/2022 and told her she does have sleep apena and she is awaiting cpap \par .. 2023 Uses cpap regularly\par .. 3/1/2023 Is using CPAP every day Uses it 4-7 h daiy\par \par ___________\par LUNG NODULES\par ___________\par . HABITS\par .. 40 PYH smoking quit age 56 in  \par . FAM HX.\par .. Fam HO ca Uterine mother  age 67\par Esoph father and brother  age 51\par \par .. 2023 CT  [facility] multiple nodules stable \par .. 5x5 mm RLL 4 mm LLL 3 mm RLL 3 mm RLL 5x5 mm RLL \par .. 1/10/2023 seen by CTS Dr SAIRA Garrett\par ___________\par RECOMMENDATION\par .. Plan CT  2024\par ___________\par SLEEP APNEA\par __________\par .. 2023 Is using CPAP regularly \par ________\par COPD\par ________\par HABITS\par .. 40 PYH smoking quit age 56 in  \par AEC. \par .. 2020 aec 210\par IGE. \par .. 2022 ige 348\par P BNP. \par .. 2022 bnp 346\par IMAGING. \par .. Ct  2022 [facility] subcm nodules all nsc 2018 Mod centrilobular and paraseptal emphysema rosetta upper lobes \par PFTS. \par .. PFTS 2023\par .. FVC 1.88 73%\par .. FEV1 1.40 73% \par .. FEV1/FVC 75%\par .. FEF 25-75 70%\par .. TLC 68%\par .. ERV 34%\par .. DLCO 68%\par \par NO OBESTRUCTION MILD REDUCTION IN DIFFUSION \par MEDS\par .. 2023 Spiriva\par .. 2023 Symbicort as needed \par \par CONTROL\par .. 2023 Did not need symbicort last week did not need it\par __________\par RECOMMENDATIONS\par .. 2023 \par .. Asthma seems to be under control\par .. PFTs 2023 did not show obstruction as FEV1/FVC was 75%\par .. There was mild reduction in TLC which could be dues to CHF induced restriction \par .. There was mild decreased diffusion cw copd \par .. Contineue Spiriva ansd use symbicort as needed \par . . 2023 RTC 3 m\par \par  TIME SPENT\par .. A total of 32 minutes were spent during this patient visit with various face to face as well as non face to face activities such as data mining medical decision making placing orders explaining plan risks benefits alternatives etc \par \par

## 2023-07-12 ENCOUNTER — RX RENEWAL (OUTPATIENT)
Age: 78
End: 2023-07-12

## 2023-07-26 ENCOUNTER — RX RENEWAL (OUTPATIENT)
Age: 78
End: 2023-07-26

## 2023-08-09 ENCOUNTER — RX RENEWAL (OUTPATIENT)
Age: 78
End: 2023-08-09

## 2023-08-09 NOTE — DISCHARGE NOTE NURSING/CASE MANAGEMENT/SOCIAL WORK - NSDCPETBCESMAN_GEN_ALL_CORE
record appropriately.     Star Jacinto, APRN - CNP If you are a smoker, it is important for your health to stop smoking. Please be aware that second hand smoke is also harmful.

## 2023-08-28 ENCOUNTER — RX RENEWAL (OUTPATIENT)
Age: 78
End: 2023-08-28

## 2023-08-28 RX ORDER — TIZANIDINE 4 MG/1
4 TABLET ORAL
Qty: 60 | Refills: 0 | Status: ACTIVE | COMMUNITY
Start: 2022-10-06 | End: 1900-01-01

## 2023-09-12 ENCOUNTER — RX RENEWAL (OUTPATIENT)
Age: 78
End: 2023-09-12

## 2023-09-12 RX ORDER — GABAPENTIN 300 MG/1
300 CAPSULE ORAL 3 TIMES DAILY
Qty: 90 | Refills: 0 | Status: ACTIVE | COMMUNITY
Start: 2022-06-27 | End: 1900-01-01

## 2023-10-11 ENCOUNTER — APPOINTMENT (OUTPATIENT)
Dept: PULMONOLOGY | Facility: CLINIC | Age: 78
End: 2023-10-11
Payer: MEDICARE

## 2023-10-11 VITALS
SYSTOLIC BLOOD PRESSURE: 115 MMHG | OXYGEN SATURATION: 99 % | HEART RATE: 67 BPM | HEIGHT: 63 IN | WEIGHT: 144 LBS | DIASTOLIC BLOOD PRESSURE: 70 MMHG | BODY MASS INDEX: 25.52 KG/M2

## 2023-10-11 DIAGNOSIS — G47.33 OBSTRUCTIVE SLEEP APNEA (ADULT) (PEDIATRIC): ICD-10-CM

## 2023-10-11 PROCEDURE — 99214 OFFICE O/P EST MOD 30 MIN: CPT

## 2023-10-11 RX ORDER — BUDESONIDE AND FORMOTEROL FUMARATE DIHYDRATE 80; 4.5 UG/1; UG/1
80-4.5 AEROSOL RESPIRATORY (INHALATION)
Qty: 2 | Refills: 4 | Status: ACTIVE | COMMUNITY
Start: 2023-10-11 | End: 1900-01-01

## 2023-10-27 NOTE — OCCUPATIONAL THERAPY INITIAL EVALUATION ADULT - TRANSFER SAFETY CONCERNS NOTED: TOILET, REHAB EVAL
decreased sequencing ability/decreased step length/decreased weight-shifting ability English    Rash, Adult  Heat rash on a person's hand.   A rash is a change in the color of your skin. A rash can also change the way your skin feels. There are many different conditions and factors that can cause a rash. Some rashes may disappear after a few days, but some may last for a few weeks. Common causes of rashes include:  Viral infections, such as:  Colds.  Measles.  Hand, foot, and mouth disease.  Bacterial infections, such as:  Scarlet fever.  Impetigo.  Fungal infections, such as Candida.  Allergic reactions to food, medicines, or skin care products.  Follow these instructions at home:  The goal of treatment is to stop the itching and keep the rash from spreading. Pay attention to any changes in your symptoms. Follow these instructions to help with your condition:    Medicine    A tube of ointment.  Take or apply over-the-counter and prescription medicines only as told by your health care provider. These may include:  Corticosteroid creams to treat red or swollen skin.  Anti-itch lotions.  Oral allergy medicines (antihistamines).  Oral corticosteroids for severe symptoms.  Skin care    Apply cool compresses to the affected areas.  Do not scratch or rub your skin.  Avoid covering the rash. Make sure the rash is exposed to air as much as possible.  Managing itching and discomfort    Avoid hot showers or baths, which can make itching worse. A cold shower may help.  Try taking a bath with:  Epsom salts. Follow  instructions on the packaging. You can get these at your local pharmacy or grocery store.  Baking soda. Pour a small amount into the bath as told by your health care provider.  Colloidal oatmeal. Follow  instructions on the packaging. You can get this at your local pharmacy or grocery store.  Try applying baking soda paste to your skin. Stir water into baking soda until it reaches a paste-like consistency.  Try applying calamine lotion. This is an over-the-counter lotion that helps to relieve itchiness.  Keep cool and out of the sun. Sweating and being hot can make itching worse.  General instructions    A person writing in a journal.  Rest as needed.  Drink enough fluid to keep your urine pale yellow.  Wear loose-fitting clothing.  Avoid scented soaps, detergents, and perfumes. Use gentle soaps, detergents, perfumes, and other cosmetic products.  Avoid any substance that causes your rash. Keep a journal to help track what causes your rash. Write down:  What you eat.  What cosmetic products you use.  What you drink.  What you wear. This includes jewelry.  Keep all follow-up visits as told by your health care provider. This is important.  Contact a health care provider if:  You sweat at night.  You lose weight.  You urinate more than normal.  You urinate less than normal, or you notice that your urine is a darker color than usual.  You feel weak.  You vomit.  Your skin or the whites of your eyes look yellow (jaundice).  Your skin:  Tingles.  Is numb.  Your rash:  Does not go away after several days.  Gets worse.  You are:  Unusually thirsty.  More tired than normal.  You have:  New symptoms.  Pain in your abdomen.  A fever.  Diarrhea.  Get help right away if you:  Have a fever and your symptoms suddenly get worse.  Develop confusion.  Have a severe headache or a stiff neck.  Have severe joint pains or stiffness.  Have a seizure.  Develop a rash that covers all or most of your body. The rash may or may not be painful.  Develop blisters that:  Are on top of the rash.  Grow larger or grow together.  Are painful.  Are inside your nose or mouth.  Develop a rash that:  Looks like purple pinprick-sized spots all over your body.  Has a "bull's eye" or looks like a target.  Is not related to sun exposure, is red and painful, and causes your skin to peel.  Summary  A rash is a change in the color of your skin. Some rashes disappear after a few days, but some may last for a few weeks.  The goal of treatment is to stop the itching and keep the rash from spreading.  Take or apply over-the-counter and prescription medicines only as told by your health care provider.  Contact a health care provider if you have new or worsening symptoms.  Keep all follow-up visits as told by your health care provider. This is important.  This information is not intended to replace advice given to you by your health care provider. Make sure you discuss any questions you have with your health care provider.    Document Revised: 06/20/2023 Document Reviewed: 09/29/2022  [employer] Patient Education © 2023 [employer]

## 2023-12-29 ENCOUNTER — RX RENEWAL (OUTPATIENT)
Age: 78
End: 2023-12-29

## 2023-12-29 RX ORDER — TIOTROPIUM BROMIDE 18 UG/1
18 CAPSULE ORAL; RESPIRATORY (INHALATION)
Qty: 30 | Refills: 5 | Status: ACTIVE | COMMUNITY
Start: 2023-12-29 | End: 1900-01-01

## 2024-01-10 ENCOUNTER — APPOINTMENT (OUTPATIENT)
Dept: PULMONOLOGY | Facility: CLINIC | Age: 79
End: 2024-01-10
Payer: MEDICARE

## 2024-01-10 VITALS
SYSTOLIC BLOOD PRESSURE: 125 MMHG | HEART RATE: 73 BPM | TEMPERATURE: 98.1 F | DIASTOLIC BLOOD PRESSURE: 60 MMHG | BODY MASS INDEX: 24.8 KG/M2 | OXYGEN SATURATION: 96 % | WEIGHT: 140 LBS

## 2024-01-10 DIAGNOSIS — J44.9 CHRONIC OBSTRUCTIVE PULMONARY DISEASE, UNSPECIFIED: ICD-10-CM

## 2024-01-10 DIAGNOSIS — R09.82 POSTNASAL DRIP: ICD-10-CM

## 2024-01-10 DIAGNOSIS — G47.33 OBSTRUCTIVE SLEEP APNEA (ADULT) (PEDIATRIC): ICD-10-CM

## 2024-01-10 PROCEDURE — 99214 OFFICE O/P EST MOD 30 MIN: CPT

## 2024-01-10 RX ORDER — IPRATROPIUM BROMIDE 21 UG/1
0.03 SPRAY NASAL
Qty: 90 | Refills: 1 | Status: ACTIVE | COMMUNITY
Start: 2024-01-10 | End: 1900-01-01

## 2024-01-10 NOTE — CONSULT LETTER
[Dear  ___] : Dear  [unfilled], [Consult Letter:] : I had the pleasure of evaluating your patient, [unfilled]. [Please see my note below.] : Please see my note below. [Consult Closing:] : Thank you very much for allowing me to participate in the care of this patient.  If you have any questions, please do not hesitate to contact me. [FreeTextEntry3] : Yours truly,  Gordon Marquez MD

## 2024-01-10 NOTE — ASSESSMENT
[FreeTextEntry1] :   ___________ IMMUNIZATION. _________ .. 2023 Flu shot .. 3/1/2023 Has PPSV 2020 .. 3/1/2023 Advised flu shot every fall  .. 3/1/2023 Advised to remain up to date with covid vaccines   ________ HOME OXYGEN. ____________ .. 1/10/2024 ra 96% .. 10/11/2023 ra 99%  .. 2023 ra rest 99% .. 3/1/2023 ra rest 98% .. does not need home O2   _____ OBESITY. ______ .. 1/10/2024 140 .. 10/11/2023 144 .. 2023 148 .. 3/1/2023 wt 138  .. 1/10/2024 bmi 24 .. 10/11/2023 bmi 25  .. 2023 bmi 26  .. 3/1/2023 bmi 24 .. Not obese   _______ LUNG CANCER SCREENING. ___________ .. 3/1/2023 .. Started age 17 quit at age 56 .. Avge 1 ppd  ..  Quit smoking   .. 40 pyh .. Does not meet screening eligibility criteria as she quit smoking 21 y ago  .. is getting periodic ct scans for known lung nodules  ____ SMOKING. ______ .. Not smoking . HISTORY .. Started age 17 quit at age 56 .. Avge 1 ppd  .. Quit smoking   .. 40 pyh ______ SLEEP APNEA _______ .. 2022 Sleep apnea ruled out 2020 psg and is no longer using cpap   .. 2022 Pt now follows with Dr Trevino who repeated sleep study 3/2022 and told her she does have sleep apena and she is awaiting cpap  .. 2023 Uses cpap regularly .. 3/1/2023 Is using CPAP every day Uses it 4-7 h daiy .. 10/11/2023 Is using 4-5 h every day  .. 1/10/2024 uses cpap 5 h daily   ___________ LUNG NODULES ___________ . HABITS ..  40 PYH smoking  quit age 56 in   . FAM HX. .. Fam HO ca Uterine mother  age 67    Esoph father and brother  age 51   .. 2023 CT  [facility] multiple nodules stable    .. 5x5 mm RLL 4 mm LLL 3 mm RLL 3 mm RLL 5x5 mm RLL  .. 1/10/2023 seen by CTS Dr SAIRA Garrett     ___________ RECOMMENDATION .. Plan CT  2024 .. 1/10/2024 Declined ct  .. 1/10/2024 wants to get ct in  .. 1/10/2024 nodules are less than 6 mm so it is reasonable to wait 24 m __________ RHINITIS _________  2022 Flonase causes epistaxis so has stopped using iit Is now using saline spray .. 1/10/2024 ipratropium nasal .03% 2-3/d  .. If still bothers her will send to allergy   ________ COPD ASTHMA OVERALP  ________ HABITS ..  40 PYH smoking  quit age 56 in   [facility].  .. 2020 aec 210 IGE.  .. 2022 ige 348 P BNP.  .. 2022 bnp 346 IMAGING.  .. Ct  2022 [facility] subcm nodules all nsc 2018 Mod centrilobular and paraseptal emphysema rosetta upper lobes  PFTS.  .. PFTS 2023 .. FVC 1.88 73% .. FEV1 1.40 73%  .. FEV1/FVC 75% .. FEF 25-75 70% .. TLC 68% .. ERV 34% .. DLCO 68%  NO OBESTRUCTION  MILD REDUCTION IN DIFFUSION  __________  ASSESSMENT RECOMMENDATIONS . 1/10/2024 Is on symbicort 80 2p as needed  .. 1/10/2024 Needed it 3 d last week .. 1/10/2024 will increase to symbicort 80 2p bid plus as needed .. 1/10/2024 is on spiriva  .. 1/10/2024 RTC 3 m    TIME SPENT .. A total of 32 minutes were spent during this patient visit with various face to face as well as non face to face activities such as data mining medical decision making placing orders explaining plan risks benefits alternatives etc

## 2024-01-10 NOTE — DISCUSSION/SUMMARY
[FreeTextEntry1] :   ___________ PATIENT DATA ______________  CONTACT. [phone]  PCP. DR NICOL NUGENT REFERRING MD. DR NICOL NUGENT INITIAL VISIT DATE . 2020 ALLERGY.  ..  2020 pncl  .. Has taken zpak in past without problem ) HISTORY.  INITIAL VISIT 2020 . 74 f PMH A fin hypertrophic cardiomyopathy diagnosed with ALICIA 10 y ago and was referred to me for lung nodules sleep apnea and COPD  She is on xarelto for a fib  . In 2020 she was referred to see sleep specialis Dr Lon Miller   HABITS.    . 2020  . Started age 17 quit at age 56 . Avge 1 ppd  . Quit smoking   . 40 pyh . No ETOH abuse No drug abuse  OCCUPN.     child support PETS.   None   FAM HO CANCER.  . Uterine Mother  at age 67   . Esophagus Brother  at age 51  FAMILY HO VTE.  No  FAM HO ASTHMA.  No ___________ PROBLEM LIST ______________       LUNG NODULES .. 2022 CT stable subcm nodules Is seeing Dr SAIRA Garrett also .. 2023 CT [facility] multiple nodules stable    .. 1/10/2023 seen by CTS Dr SAIRA Garrett  COPD/ASTHMA  .. 2022 Is doing well on symbicort 80 1p prn  .. 2022 Used it twice a week Has spacer and rinses mouth water after use   .. 1/10/2024 needed symbicort 3 times last week  .. 1/10/2024 Increased symbicort 80 to 2p bid plus as needed   EXACERBATION .. 10/2022 Had course of prednisone x 10 d    POSTNASAL DRIP  .. 2022 Flonase causes epistaxis so has stopped using iit Is now using saline spray .. 1/10/2024 Ipratropium nasal spray  .. 1/10/2024 Refer to allergy specialist   SLEEP APNEA . 2022 Sleep apnea ruled out 2020 psg and is no longer using cpap   . 2022 Pt now follows with Dr Trevino who repeated sleep study 3/2022 and told her she does have sleep apena and she is awaiting cpap  . 3/1/2023 Is using CPAP every day Uses it 4-7 h jasmin bailey ..  (on xarelto, s/p ablation in 2017),  Cardiology, Dr. Chauncey Pierre, [facility] in [facility] for Afib.   PALPITATIONS .. Has Loop recorder for palpitations.  HLD,    PREOP PULMONARY KNEE  REPLACEDMENT SCHEDULED 2021   TKR r knee 2021 Dr Araujo   Hyperthyroidism,  . TSH (0.27-4.2) 9/10/2020 TSH .04 9/10/2020 TSH was .04 (.27-4.2 Advised to dw her PMD and see endocrine   DM  . Endocrinologist  Dr. Delatorre  hba1c was around 8 in Dec 2017.   GERD . Gastroenterologist  Dr. Aristeo velazquez . . Ophtho  Dr. Sandro Porter  . GYN  Dr. Liudmila Hess in [place].  SPINAL STENOSIS  . 2020 gets epidural injectionsSalomón Matthews

## 2024-01-10 NOTE — REVIEW OF SYSTEMS
[Negative] : Endocrine [TextBox_3] : has increased postnasal drp and had to use symbicort 80 2p 3/wk

## 2024-04-24 ENCOUNTER — APPOINTMENT (OUTPATIENT)
Dept: PULMONOLOGY | Facility: CLINIC | Age: 79
End: 2024-04-24
Payer: MEDICARE

## 2024-04-24 VITALS
OXYGEN SATURATION: 95 % | WEIGHT: 137 LBS | HEIGHT: 63 IN | BODY MASS INDEX: 24.27 KG/M2 | HEART RATE: 76 BPM | SYSTOLIC BLOOD PRESSURE: 122 MMHG | DIASTOLIC BLOOD PRESSURE: 74 MMHG

## 2024-04-24 DIAGNOSIS — R91.8 OTHER NONSPECIFIC ABNORMAL FINDING OF LUNG FIELD: ICD-10-CM

## 2024-04-24 DIAGNOSIS — J45.20 MILD INTERMITTENT ASTHMA, UNCOMPLICATED: ICD-10-CM

## 2024-04-24 PROCEDURE — 99214 OFFICE O/P EST MOD 30 MIN: CPT

## 2024-04-24 NOTE — ASSESSMENT
[FreeTextEntry1] :   ___________ IMMUNIZATION. _________ .. 2023 Flu shot .. 3/1/2023 Has PPSV 2020 .. 3/1/2023 Advised flu shot every fall  .. 3/1/2023 Advised to remain up to date with covid vaccines   ________ HOME OXYGEN. ____________ .. 2024 ra 95% .. 1/10/2024 ra 96% .. 10/11/2023 ra 99%  .. 2023 ra rest 99% .. 3/1/2023 ra rest 98% .. does not need home O2   _____ OBESITY. ______ .. 2024 137 .. 1/10/2024 140 .. 10/11/2023 144 .. 2023 148 .. 3/1/2023 wt 138  .. 2024 bmi 24  .. 1/10/2024 bmi 24 .. 10/11/2023 bmi 25  .. 2023 bmi 26  .. 3/1/2023 bmi 24 .. Not obese   _______ LUNG CANCER SCREENING. ___________ .. 3/1/2023 .. Started age 17 quit at age 56 .. Avge 1 ppd  ..  Quit smoking   .. 40 pyh .. Does not meet screening eligibility criteria as she quit smoking 21 y ago  .. is getting periodic ct scans for known lung nodules  ____ SMOKING. ______ .. Not smoking . HISTORY .. Started age 17 quit at age 56 .. Avge 1 ppd  .. Quit smoking   .. 40 pyh ______ SLEEP APNEA _______ .. 2022 Sleep apnea ruled out 2020 psg and is no longer using cpap   .. 2022 Pt now follows with Dr Trevino who repeated sleep study 3/2022 and told her she does have sleep apena and she is awaiting cpap  .. 2023 Uses cpap regularly .. 3/1/2023 Is using CPAP every day Uses it 4-7 h daiy .. 10/11/2023 Is using 4-5 h every day  .. 1/10/2024 uses cpap 5 h daily .. 2024 Uses cpap 5 h daily   ___________ LUNG NODULES ___________ . HABITS ..  40 PYH smoking  quit age 56 in   . FAM HX. .. Fam HO ca Uterine mother  age 67    Esoph father and brother  age 51 . IMAGING  .. [facility]22 [facility]       cw dating back to 2018      DETAILS cw multiple prior studies      latest 2021       COPD       Mild apical scarring       Scattered small circumscribed nodules bl      5x5 mm rll sup segment      4 mm ant lll       3 mm rml      3 mm rll       5x3 mm ant rll      5x5 mm post rll      Nodules all nsc since 2018  .. IMP Stable bl nodules No new nodules      ________ COPD ASTHMA OVERALP  ________ HABITS ..  40 PYH smoking  quit age 56 in   [facility].  .. 2020 aec 210 IGE.  .. 2022 ige 348 P BNP.  .. 2022 bnp 346 IMAGING.  .. Ct  2022 [facility] subcm nodules all nsc 2018 Mod centrilobular and paraseptal emphysema rosetta upper lobes  .. 2024 as noted above pt wants to hold off ct till next yr  __________  ASSESSMENT RECOMMENDATIONS . 2024 Is back to using symbicort 80 1p every 4 h as needed . 2024 Did not need it in last 2 weeks  . 2024 no noct awakening   TIME SPENT .. A total of 32 minutes were spent during this patient visit with various face to face as well as non face to face activities such as data mining medical decision making placing orders explaining plan risks benefits alternatives

## 2024-04-24 NOTE — DISCUSSION/SUMMARY
[FreeTextEntry1] :   ___________ PATIENT DATA ______________  CONTACT. [phone]  PCP. DR NICOL NUGENT REFERRING MD. DR NICOL NUGENT INITIAL VISIT DATE . 2020 ALLERGY.  ..  2020 pncl  .. Has taken zpak in past without problem ) HISTORY.  INITIAL VISIT 2020 . 74 f PMH A fin hypertrophic cardiomyopathy diagnosed with ALICIA 10 y ago and was referred to me for lung nodules sleep apnea and COPD  She is on xarelto for a fib  . In 2020 she was referred to see sleep specialis Dr Lon Miller   HABITS.    . 2020  . Started age 17 quit at age 56 . Avge 1 ppd  . Quit smoking   . 40 pyh . No ETOH abuse No drug abuse  OCCUPN.     child support PETS.   None   FAM HO CANCER.  . Uterine Mother  at age 67   . Esophagus Brother  at age 51  FAMILY HO VTE.  No  FAM HO ASTHMA.  No ___________ PROBLEM LIST ______________       LUNG NODULES .. 2022 CT stable subcm nodules Is seeing Dr SAIRA Garrett also .. 2023 [facility] multiple nodules stable    .. 1/10/2023 seen by CTS Dr SAIRA Garrett  COPD/ASTHMA  .. 2022 Is doing well on symbicort 80 1p prn  .. 2022 Used it twice a week Has spacer and rinses mouth water after use   .. 1/10/2024 needed symbicort 3 times last week  .. 1/10/2024 Increased symbicort 80 to 2p bid plus as needed   EXACERBATION .. 10/2022 Had course of prednisone x 10 d    POSTNASAL DRIP  .. 2022 Flonase causes epistaxis so has stopped using iit Is now using saline spray .. 1/10/2024 Ipratropium nasal spray  .. 1/10/2024 Refer to allergy specialist   SLEEP APNEA . 2022 Sleep apnea ruled out 2020 psg and is no longer using cpap   . 2022 Pt now follows with Dr Trevino who repeated sleep study 3/2022 and told her she does have sleep apena and she is awaiting cpap  . 3/1/2023 Is using CPAP every day Uses it 4-7 h jasmin bailey ..  (on xarelto, s/p ablation in 2017),  Cardiology, Dr. Chauncey Pierre, [facility] in [facility] for Afib.   PALPITATIONS .. Has Loop recorder for palpitations.  HLD,    PREOP PULMONARY KNEE  REPLACEDMENT SCHEDULED 2021   TKR r knee 2021 Dr Araujo

## 2024-08-06 ENCOUNTER — APPOINTMENT (OUTPATIENT)
Dept: PULMONOLOGY | Facility: CLINIC | Age: 79
End: 2024-08-06

## 2024-08-06 PROBLEM — J43.9 EMPHYSEMA LUNG: Status: ACTIVE | Noted: 2024-08-06

## 2024-08-06 PROCEDURE — 99215 OFFICE O/P EST HI 40 MIN: CPT

## 2024-08-06 NOTE — CONSULT LETTER
[Dear  ___] : Dear  [unfilled], [Consult Letter:] : I had the pleasure of evaluating your patient, [unfilled]. [Please see my note below.] : Please see my note below. [Consult Closing:] : Thank you very much for allowing me to participate in the care of this patient.  If you have any questions, please do not hesitate to contact me. [FreeTextEntry3] : Sincerely,  Ginette Almeida MD [facility] tel: [phone] fax: [phone]

## 2024-08-06 NOTE — HISTORY OF PRESENT ILLNESS
[Former] : former [Never] : never [TextBox_4] : Ms. MARGA VÁZQUEZ is a 78 year old woman, former smoker (40-pack-year history, quit 2002) with pulmonary nodules and emphysema is here for evaluation  Using Tiotropium daily. Symbicort as needed - about once/week.  Sometimes gets winded with exertion. Denies coughing. Occ wheezing when laying down. Denies exacerbatoins/bronchitis/steroid use.   ALICIA - follows with Dr Trevino. On CPAP.   CT chest January 2022 ([facility], report only) moderate central lobular and paraseptal emphysema.  Multiple nodules, measuring up to 5 mm all stable since 2018.   ROS:  denies fevers, chills, night sweats, unintentional weight loss denies known autoimmune disease  PMH: Diabetes, hypothyroid, hypertension, Afib (on Xarelto), hypertrophic cardiomyopathy, arthritis PSH: knee replacement Meds: per chart All: Penicillin SH: Started age 17 quit at age 56, quit 2002 FH: Denies family hx of pulmonary disease PMD: NICOL NUGENT Immunizations: Needs Prevnar 20.  [TextBox_11] : 1 [TextBox_13] : 40 [YearQuit] : 2002

## 2024-08-06 NOTE — ASSESSMENT
[FreeTextEntry1] :  Ms. MARGA VÁZQUEZ is a 78 year old woman, former smoker (40-pack-year history, quit 2002) with pulmonary nodules and emphysema is here for evaluation  #Emphysema -noted on imaging.  Pulmonary function testing a year ago was negative evidence of obstruction, normal lung volumes, normal DLCO -- Continue tiotropium, can use albuterol on as-needed basis -- Stop Symbicort -- Repeat pulmonary function testing next visit  #Former smoker/Lung nodules -no indication for further CT based on Fleischner guidelines.  Nodules measuring up to 5 mm and have been stable since 2018.  Patient is requesting repeat CT chest.  All questions answered. Patient in agreement with plan.  Follow up in 4-5 mo with PFTs or sooner if needed.

## 2024-08-08 NOTE — OCCUPATIONAL THERAPY INITIAL EVALUATION ADULT - LEVEL OF INDEPENDENCE: DRESS LOWER BODY, OT EVAL
Quality 226: Preventive Care And Screening: Tobacco Use: Screening And Cessation Intervention: Patient screened for tobacco use and is an ex/non-smoker
Detail Level: Detailed
Quality 130: Documentation Of Current Medications In The Medical Record: Current Medications Documented
supervision

## 2024-10-26 NOTE — ASSESSMENT
[FreeTextEntry1] : \par \par VISIT DATES \par \par 2020  Initial visit [place]\par \par \par                                             \par COMPLAINTS/ROS \par      \par 2020 \par 1) Needs sleep apnea MD \par 2) Needs script for symbicort \par \par \par                                             \par CURRENT SMOKING.  2020 No    .         \par COUGH.    2020 Occasionally in am .                        \par PHLEGM.   2020 Yes from nose    \par CLUBBING.    2020 No                .\par WHEEZE.  2020 No \par JUGULAR VENOUS DISTENTION.  2020 No                            \par PEDAL EDEMA.   2020 No    \par \par                                             \par BP.      \par 2020 136/75    \par HR.  \par 2020 65                                                     \par WT.      \par 2020 166                        \par BMI.  \par 2020 29     \par PULSE OX.\par REST.\par 2020 95%\par EXERCISE. \par \par \par ALLERGY. 2020 pncl Has taken zpak in past without problem \par HABITS (ETOH/DRUGS).   2020 Quit smoking 2002 1 ppd No ETOH abuse No drug abuse \par FAMILY.   DM Mother \par Uterine Mother  at age 67  \par Esophagus Brother  at age 51 \par BIRTHPLACE.\par [place] \par TRAVEL.                 \par IMMUNIZATION.                           \par PETS.                \par OCCUPN.       [facility] Child support\par \par ______________________________________\par \par PATIENT GURPREET GRESHAM  1945\par PROBLEM LUNG NODULES\par ASSESSMENT/RECOMMENDATIONS\par ______________________________________\par \par PERTINENT INFORMATION.\par 2018 CXr showed possible mediuastinal mass and patient was referred for CT ch which did not show mediastinal mass but showed subcm lung nodules and one nodule had increased in size \par She was sent to see Dr SAIRA Garrett who she saw on 2018\par \par Former smoker 1 ppd quit  \par \par Fam HO CA \par FAM \par Uterine Mother  at age 67  \par Esophagus Brother  at age 51 \par \par COURSE \par Pt has seen Dr SAIRA Garrett CTS and is being followed by serial ct chests\par \par ct Chest [facility] 2018 222  cw 2018 \par 1) No new parenchymal masses or nodules \par 2) Multiple stable subcm noncalci bl nodules upto 5 mm the klargest within the sup rll and in rll \par 3) 2 nodules as well as 3 mm lll nodule are stable cw cta 2017 \par 4) Centrilobular emphysema rosetta upper lobes \par 5) loop recorder \par \par \par ASSESSMENT                                                                                                 \par PLAN.                                \par 2020 Refer CT ch \par 2020 RTC 3 w\par \par \par ______________________________________\par \par PATIENT GURPREET GRESHAM  1945\par PROBLEM COPD \par ASSESSMENT/RECOMMENDATIONS\par ______________________________________\par \par PERTINENT INFORMATION.\par 2020 75 f gets sob on exertion\par Former smoker quit  Used to smoke 3/4 ppd \par EXACERBATIONS \par No  hospital stays \par Bronchitis episode urgent care once around \par FAM \par No asthma \par PETS \par None Used to have dog\par \par \par \par SUBJECTIVE/OBJECTIVE \par \par MMRC\par 2020 MMRC 2 Cannot keep up with others because of breathing and knee prob\par \par \par \par ASSESSMENT  PLAN.                                \par 2020 Check PFTS \par 2020 Blood tests\par \par \par ______________________________________\par \par PATIENT  MARGA VÁZQUEZ \par PROBLEM SLEEP APNEA \par ASSESSMENT/RECOMMENDATIONS\par ______________________________________\par \par PERTINENT INFORMATION.\par On CPAP 9 y (2020) \par 3/20/2019 Uses CPAP No EDS \par 2018 has been using CPAP for about 8 years \par \par SUBJECTIVE/OBJECTIVE \par 2020 Machine is not working right Wants to see sleep sspecialist as she is getting headaches\par \par \par \par ASSESSMENT PLAN\par 2020 refre Sleep specialist                 \par \par \par ORDERS/DATE\par 2020 \par COPD DYSPNEA\par 2020 Refer PFTS \par 2020 Check COVID \par 2020 Symbicort 160 sent [facility] \par 2020 Check BNP CBC SMA7 \par LUNG NODULES\par 2020 refer followup ct chest Usually goes to [facility] \par 2020 RTC 3 w \par SLEEP APNEA\par 2020 refer to sleep specialist as machine not working wakes up with headaches\par 2020 Check TSH \par \par \par GURPREET GRESHAM  1945 [phone] (H) [phone] (M) NICOL NUGENT MD  \par \par 
(1) Other Medications/None

## 2024-12-03 ENCOUNTER — APPOINTMENT (OUTPATIENT)
Dept: PULMONOLOGY | Facility: CLINIC | Age: 79
End: 2024-12-03
Payer: MEDICARE

## 2024-12-03 VITALS
WEIGHT: 137 LBS | SYSTOLIC BLOOD PRESSURE: 104 MMHG | DIASTOLIC BLOOD PRESSURE: 68 MMHG | HEIGHT: 63 IN | HEART RATE: 62 BPM | OXYGEN SATURATION: 99 % | BODY MASS INDEX: 24.27 KG/M2

## 2024-12-03 DIAGNOSIS — J43.9 EMPHYSEMA, UNSPECIFIED: ICD-10-CM

## 2024-12-03 PROCEDURE — 94729 DIFFUSING CAPACITY: CPT

## 2024-12-03 PROCEDURE — 94727 GAS DIL/WSHOT DETER LNG VOL: CPT

## 2024-12-03 PROCEDURE — 99214 OFFICE O/P EST MOD 30 MIN: CPT | Mod: 25

## 2024-12-03 PROCEDURE — 94060 EVALUATION OF WHEEZING: CPT

## 2024-12-03 RX ORDER — ALBUTEROL SULFATE 90 UG/1
108 (90 BASE) INHALANT RESPIRATORY (INHALATION)
Qty: 1 | Refills: 4 | Status: ACTIVE | COMMUNITY
Start: 2024-12-03 | End: 1900-01-01

## 2024-12-03 RX ORDER — ALBUTEROL SULFATE 2.5 MG/3ML
(2.5 MG/3ML) SOLUTION RESPIRATORY (INHALATION)
Qty: 1 | Refills: 2 | Status: ACTIVE | COMMUNITY
Start: 2024-12-03 | End: 1900-01-01

## 2024-12-03 RX ORDER — UMECLIDINIUM BROMIDE AND VILANTEROL TRIFENATATE 62.5; 25 UG/1; UG/1
62.5-25 POWDER RESPIRATORY (INHALATION)
Qty: 1 | Refills: 2 | Status: ACTIVE | COMMUNITY
Start: 2024-12-03 | End: 1900-01-01

## 2025-01-21 NOTE — ASU PREOP CHECKLIST - HOW ADMINISTERED
Patient called states her medication is not at her pharmacy. Writer looked and informed patient it looks like it I out for PA. Please call patient and let her know if this is correct and how long it may take to get her medication.   Disp Refills Start End    Seladelpar Lysine (Livdelzi) 10 MG Cap           Self Administrated

## 2025-03-19 ENCOUNTER — RX RENEWAL (OUTPATIENT)
Age: 80
End: 2025-03-19

## 2025-05-09 ENCOUNTER — APPOINTMENT (OUTPATIENT)
Dept: PAIN MANAGEMENT | Facility: CLINIC | Age: 80
End: 2025-05-09
Payer: MEDICARE

## 2025-05-09 VITALS — HEIGHT: 63 IN | BODY MASS INDEX: 24.8 KG/M2 | WEIGHT: 140 LBS

## 2025-05-09 DIAGNOSIS — M25.552 PAIN IN LEFT HIP: ICD-10-CM

## 2025-05-09 DIAGNOSIS — M54.17 RADICULOPATHY, LUMBOSACRAL REGION: ICD-10-CM

## 2025-05-09 DIAGNOSIS — M54.50 LOW BACK PAIN, UNSPECIFIED: ICD-10-CM

## 2025-05-09 PROCEDURE — 99214 OFFICE O/P EST MOD 30 MIN: CPT

## 2025-06-09 ENCOUNTER — APPOINTMENT (OUTPATIENT)
Dept: PAIN MANAGEMENT | Facility: CLINIC | Age: 80
End: 2025-06-09
Payer: MEDICARE

## 2025-06-09 PROCEDURE — 77002 NEEDLE LOCALIZATION BY XRAY: CPT | Mod: 59

## 2025-06-09 PROCEDURE — J3490M: CUSTOM

## 2025-06-09 PROCEDURE — 27095 INJECTION FOR HIP X-RAY: CPT | Mod: LT

## 2025-06-09 PROCEDURE — 73525 CONTRAST X-RAY OF HIP: CPT | Mod: 26,59

## 2025-07-07 ENCOUNTER — APPOINTMENT (OUTPATIENT)
Dept: PAIN MANAGEMENT | Facility: CLINIC | Age: 80
End: 2025-07-07
Payer: MEDICARE

## 2025-07-07 VITALS — BODY MASS INDEX: 24.98 KG/M2 | HEIGHT: 63 IN | WEIGHT: 141 LBS

## 2025-07-07 PROCEDURE — 99214 OFFICE O/P EST MOD 30 MIN: CPT

## 2025-07-15 ENCOUNTER — APPOINTMENT (OUTPATIENT)
Dept: PAIN MANAGEMENT | Facility: CLINIC | Age: 80
End: 2025-07-15

## 2025-07-31 ENCOUNTER — APPOINTMENT (OUTPATIENT)
Dept: PULMONOLOGY | Facility: CLINIC | Age: 80
End: 2025-07-31
Payer: MEDICARE

## 2025-07-31 VITALS
OXYGEN SATURATION: 94 % | WEIGHT: 139.44 LBS | BODY MASS INDEX: 24.71 KG/M2 | DIASTOLIC BLOOD PRESSURE: 64 MMHG | HEART RATE: 70 BPM | HEIGHT: 63 IN | SYSTOLIC BLOOD PRESSURE: 100 MMHG

## 2025-07-31 DIAGNOSIS — R05.1 ACUTE COUGH: ICD-10-CM

## 2025-07-31 DIAGNOSIS — J44.9 CHRONIC OBSTRUCTIVE PULMONARY DISEASE, UNSPECIFIED: ICD-10-CM

## 2025-07-31 DIAGNOSIS — J43.9 EMPHYSEMA, UNSPECIFIED: ICD-10-CM

## 2025-07-31 PROCEDURE — 99214 OFFICE O/P EST MOD 30 MIN: CPT

## 2025-07-31 PROCEDURE — G2211 COMPLEX E/M VISIT ADD ON: CPT

## 2025-08-05 ENCOUNTER — RX RENEWAL (OUTPATIENT)
Age: 80
End: 2025-08-05

## 2025-08-12 ENCOUNTER — APPOINTMENT (OUTPATIENT)
Dept: PAIN MANAGEMENT | Facility: CLINIC | Age: 80
End: 2025-08-12
Payer: MEDICARE

## 2025-08-12 VITALS — WEIGHT: 138 LBS | HEIGHT: 63 IN | BODY MASS INDEX: 24.45 KG/M2

## 2025-08-12 DIAGNOSIS — M54.17 RADICULOPATHY, LUMBOSACRAL REGION: ICD-10-CM

## 2025-08-12 DIAGNOSIS — M54.50 LOW BACK PAIN, UNSPECIFIED: ICD-10-CM

## 2025-08-12 DIAGNOSIS — M47.816 SPONDYLOSIS W/OUT MYELOPATHY OR RADICULOPATHY, LUMBAR REGION: ICD-10-CM

## 2025-08-12 PROCEDURE — 99214 OFFICE O/P EST MOD 30 MIN: CPT

## 2025-09-04 ENCOUNTER — RX RENEWAL (OUTPATIENT)
Age: 80
End: 2025-09-04

## 2025-09-15 ENCOUNTER — APPOINTMENT (OUTPATIENT)
Dept: PAIN MANAGEMENT | Facility: CLINIC | Age: 80
End: 2025-09-15
Payer: MEDICARE

## 2025-09-15 PROCEDURE — J3490M: CUSTOM

## 2025-09-15 PROCEDURE — 64494 INJ PARAVERT F JNT L/S 2 LEV: CPT | Mod: 50,59

## 2025-09-15 PROCEDURE — 64493 INJ PARAVERT F JNT L/S 1 LEV: CPT | Mod: 50
